# Patient Record
Sex: FEMALE | Race: BLACK OR AFRICAN AMERICAN | HISPANIC OR LATINO | Employment: FULL TIME | URBAN - METROPOLITAN AREA
[De-identification: names, ages, dates, MRNs, and addresses within clinical notes are randomized per-mention and may not be internally consistent; named-entity substitution may affect disease eponyms.]

---

## 2019-09-09 ENCOUNTER — OFFICE VISIT (OUTPATIENT)
Dept: FAMILY MEDICINE CLINIC | Facility: CLINIC | Age: 24
End: 2019-09-09
Payer: COMMERCIAL

## 2019-09-09 VITALS
HEART RATE: 68 BPM | DIASTOLIC BLOOD PRESSURE: 70 MMHG | RESPIRATION RATE: 12 BRPM | OXYGEN SATURATION: 99 % | SYSTOLIC BLOOD PRESSURE: 128 MMHG | WEIGHT: 149.8 LBS | TEMPERATURE: 98.1 F | HEIGHT: 64 IN | BODY MASS INDEX: 25.57 KG/M2

## 2019-09-09 DIAGNOSIS — Z00.00 WELL ADULT EXAM: Primary | ICD-10-CM

## 2019-09-09 DIAGNOSIS — Z11.4 SCREENING FOR HIV (HUMAN IMMUNODEFICIENCY VIRUS): ICD-10-CM

## 2019-09-09 DIAGNOSIS — L70.8 INFLAMMATORY ACNE: ICD-10-CM

## 2019-09-09 DIAGNOSIS — D50.9 IRON DEFICIENCY ANEMIA, UNSPECIFIED IRON DEFICIENCY ANEMIA TYPE: ICD-10-CM

## 2019-09-09 DIAGNOSIS — E78.2 MIXED HYPERLIPIDEMIA: ICD-10-CM

## 2019-09-09 PROCEDURE — 99395 PREV VISIT EST AGE 18-39: CPT | Performed by: FAMILY MEDICINE

## 2019-09-09 PROCEDURE — 3008F BODY MASS INDEX DOCD: CPT | Performed by: FAMILY MEDICINE

## 2019-09-09 PROCEDURE — 99203 OFFICE O/P NEW LOW 30 MIN: CPT | Performed by: FAMILY MEDICINE

## 2019-09-09 RX ORDER — CLINDAMYCIN AND BENZOYL PEROXIDE 10; 50 MG/G; MG/G
GEL TOPICAL 2 TIMES DAILY
Qty: 50 G | Refills: 5 | Status: SHIPPED | OUTPATIENT
Start: 2019-09-09 | End: 2019-12-09 | Stop reason: ALTCHOICE

## 2019-09-09 RX ORDER — MINOCYCLINE HYDROCHLORIDE 50 MG/1
50 TABLET ORAL DAILY
Qty: 30 TABLET | Refills: 1 | Status: SHIPPED | OUTPATIENT
Start: 2019-09-09 | End: 2019-12-09 | Stop reason: ALTCHOICE

## 2019-09-09 NOTE — PROGRESS NOTES
Assessment/Plan:     Problem List Items Addressed This Visit     None      Visit Diagnoses     Well adult exam    -  Primary        Diagnoses and all orders for this visit:      Well adult exam  ·         Continue healthy diet   ·         Encourage exercise 4 times a week or more for minimum 30 minutes  ·         Continue to see dentist, wear seatbelt  ·         Health maintenance reviewed - she will try to get us her immunization records  She has had many recent vaccines with her extensive travel history  Will update Tdap if needed at next visit  Consider flu shot at next visit   Prescription update fasting blood work   Reviewed age appropriate health maintenance screenings and immunizations that are due, risks and benefits of these    -she declines Chlamydia testing as she has never been sexually active  Pap last year - records requested today   Health Maintenance   Topic Date Due    Depression Screening PHQ  1995    BMI: Followup Plan  05/11/2013    BMI: Adult  05/11/2013    DTaP,Tdap,and Td Vaccines (1 - Tdap) 05/11/2016    PAP SMEAR  05/11/2016    INFLUENZA VACCINE  07/01/2019    Pneumococcal Vaccine: 65+ Years (1 of 2 - PCV13) 05/11/2060    Pneumococcal Vaccine: Pediatrics (0 to 5 Years) and At-Risk Patients (6 to 59 Years)  Aged Out    HEPATITIS B VACCINES  Aged Out     No follow-ups on file  There are no Patient Instructions on file for this visit  BMI Counseling: Body mass index is 25 42 kg/m²  Discussed the patient's BMI with her  The BMI is above average  BMI counseling and education was provided to the patient  Nutrition recommendations include 3-5 servings of fruits/vegetables daily  Exercise recommendations include exercising 3-5 times per week        Subjective:    CHELLY Greco is a 25 y o  female who presents today for a physical      Chief Complaint   Patient presents with    Physical Exam    Back Pain     x 2 week     Bump under chin     x 1 week smaller now but still present  ---Above per clinical staff & reviewed  ---  No My Sticky Note on file  PHQ-9 Depression Screening    PHQ-9:    Frequency of the following problems over the past two weeks:               Diet: healthy, water, some juice  Exercise:  Walks to work daily   Dental visits:  No   Seatbelt: yes     Concerns today:  Works with the [employer] from [employer] 4 months ago   Lives alone - travels often     Last travel with vaccines [place] for travel   [address] [place] and then [place]     Last pap in [place] - normal   Not SA now or ever          The following portions of the patient's history were reviewed and updated as appropriate: allergies, current medications, past family history, past medical history, past social history, past surgical history and problem list      No current outpatient medications on file  No current facility-administered medications for this visit  Review of Systems  ROS:  all others negative - no chest pain, SOB, normal urine and bowels  no GERD  sleeping well  mood good  Objective:      /70 (BP Location: Left arm)   Pulse 68   Temp 98 1 °F (36 7 °C)   Resp 12   Ht 5' 4 37" (1 635 m)   Wt 67 9 kg (149 lb 12 8 oz)   SpO2 99%   BMI 25 42 kg/m²     BP Readings from Last 3 Encounters:   09/09/19 128/70     Wt Readings from Last 3 Encounters:   09/09/19 67 9 kg (149 lb 12 8 oz)     Physical Exam   Constitutional: she is oriented to person, place, and time  she appears well-developed and well-nourished  HENT: Head: Normocephalic  Right Ear: External ear normal  Tympanic membrane normal    Left Ear: External ear normal  Tympanic membrane normal    Nose: Nose normal  No mucosal edema, No rhinorrhea  Right sinus exhibits no maxillary sinus tenderness  Left sinus exhibits no maxillary sinus tenderness  Mouth/Throat: Oropharynx is clear and moist    Eyes: Normal conjunctiva  No erythema  No discharge    Neck: No pain on exam  Neck supple  Lymph node  Cardiovascular: Normal rate, regular rhythm and normal heart sounds  Pulmonary/Chest: Effort normal and breath sounds normal  No wheezes  No rales  No rhonchi  Abdominal: Soft  Bowel sounds are normal  There is no tenderness  Musculoskeletal: she exhibits no edema  Lymphadenopathy: she has no cervical adenopathy  + 8 mm oval shaped smooth symmetrical submandibular LA  Mild tenderness  Neurological: she  is alert and oriented to person, place, and time  Skin: Skin is warm and dry  Face chest and back  +diffuse 1 mm pusutles  + post inflammatory hyperpigmentation  + few papules  No cysts  Posterior arms  KP  Psychiatric: she  has a normal mood and affect  her behavior is normal  Thought content normal    Vitals reviewed

## 2019-09-09 NOTE — PATIENT INSTRUCTIONS
Kaylie [address] [facility], [address], [place], [address] [facility]  [address] [facility]   [id number] [place] [address] [place] [address], [place]  [address] [place] [address] [facility] , [address], [facility], [address] [facility]      Eliazar Garcia, DMD  [address], [address]  [place], [address]  [address] [facility], [address] [facility], [address] [facility], [facility], [address]   [phone]     [facility] [address], [place]y Canc[place]   [phone]

## 2019-12-06 ENCOUNTER — TRANSCRIBE ORDERS (OUTPATIENT)
Dept: LAB | Facility: HOSPITAL | Age: 24
End: 2019-12-06

## 2019-12-06 ENCOUNTER — LAB (OUTPATIENT)
Dept: LAB | Facility: HOSPITAL | Age: 24
End: 2019-12-06
Payer: COMMERCIAL

## 2019-12-06 DIAGNOSIS — E78.2 MIXED HYPERLIPIDEMIA: ICD-10-CM

## 2019-12-06 DIAGNOSIS — D50.9 IRON DEFICIENCY ANEMIA, UNSPECIFIED IRON DEFICIENCY ANEMIA TYPE: ICD-10-CM

## 2019-12-06 LAB
ALBUMIN SERPL BCP-MCNC: 3.7 G/DL (ref 3.5–5)
ALP SERPL-CCNC: 61 U/L (ref 46–116)
ALT SERPL W P-5'-P-CCNC: 19 U/L (ref 12–78)
ANION GAP SERPL CALCULATED.3IONS-SCNC: 4 MMOL/L (ref 4–13)
AST SERPL W P-5'-P-CCNC: 13 U/L (ref 5–45)
BASOPHILS # BLD AUTO: 0.03 THOUSANDS/ΜL (ref 0–0.1)
BASOPHILS NFR BLD AUTO: 0 % (ref 0–1)
BILIRUB SERPL-MCNC: 0.47 MG/DL (ref 0.2–1)
BUN SERPL-MCNC: 9 MG/DL (ref 5–25)
CALCIUM SERPL-MCNC: 9.4 MG/DL (ref 8.3–10.1)
CHLORIDE SERPL-SCNC: 105 MMOL/L (ref 100–108)
CHOLEST SERPL-MCNC: 212 MG/DL (ref 50–200)
CO2 SERPL-SCNC: 28 MMOL/L (ref 21–32)
CREAT SERPL-MCNC: 0.87 MG/DL (ref 0.6–1.3)
EOSINOPHIL # BLD AUTO: 0.16 THOUSAND/ΜL (ref 0–0.61)
EOSINOPHIL NFR BLD AUTO: 2 % (ref 0–6)
ERYTHROCYTE [DISTWIDTH] IN BLOOD BY AUTOMATED COUNT: 12.6 % (ref 11.6–15.1)
FERRITIN SERPL-MCNC: 7 NG/ML (ref 8–388)
GFR SERPL CREATININE-BSD FRML MDRD: 108 ML/MIN/1.73SQ M
GLUCOSE P FAST SERPL-MCNC: 93 MG/DL (ref 65–99)
HCT VFR BLD AUTO: 41.1 % (ref 34.8–46.1)
HDLC SERPL-MCNC: 98 MG/DL
HGB BLD-MCNC: 12.4 G/DL (ref 11.5–15.4)
IMM GRANULOCYTES # BLD AUTO: 0.01 THOUSAND/UL (ref 0–0.2)
IMM GRANULOCYTES NFR BLD AUTO: 0 % (ref 0–2)
LDLC SERPL CALC-MCNC: 105 MG/DL (ref 0–100)
LDLC SERPL DIRECT ASSAY-MCNC: 79 MG/DL (ref 0–100)
LYMPHOCYTES # BLD AUTO: 2.78 THOUSANDS/ΜL (ref 0.6–4.47)
LYMPHOCYTES NFR BLD AUTO: 33 % (ref 14–44)
MCH RBC QN AUTO: 26.8 PG (ref 26.8–34.3)
MCHC RBC AUTO-ENTMCNC: 30.2 G/DL (ref 31.4–37.4)
MCV RBC AUTO: 89 FL (ref 82–98)
MONOCYTES # BLD AUTO: 0.65 THOUSAND/ΜL (ref 0.17–1.22)
MONOCYTES NFR BLD AUTO: 8 % (ref 4–12)
NEUTROPHILS # BLD AUTO: 4.74 THOUSANDS/ΜL (ref 1.85–7.62)
NEUTS SEG NFR BLD AUTO: 57 % (ref 43–75)
NONHDLC SERPL-MCNC: 114 MG/DL
NRBC BLD AUTO-RTO: 0 /100 WBCS
PLATELET # BLD AUTO: 287 THOUSANDS/UL (ref 149–390)
PMV BLD AUTO: 10.5 FL (ref 8.9–12.7)
POTASSIUM SERPL-SCNC: 4.2 MMOL/L (ref 3.5–5.3)
PROT SERPL-MCNC: 7.7 G/DL (ref 6.4–8.2)
RBC # BLD AUTO: 4.62 MILLION/UL (ref 3.81–5.12)
SODIUM SERPL-SCNC: 137 MMOL/L (ref 136–145)
TRIGL SERPL-MCNC: 46 MG/DL
WBC # BLD AUTO: 8.37 THOUSAND/UL (ref 4.31–10.16)

## 2019-12-06 PROCEDURE — 80061 LIPID PANEL: CPT

## 2019-12-06 PROCEDURE — 82728 ASSAY OF FERRITIN: CPT

## 2019-12-06 PROCEDURE — 83721 ASSAY OF BLOOD LIPOPROTEIN: CPT

## 2019-12-06 PROCEDURE — 80053 COMPREHEN METABOLIC PANEL: CPT

## 2019-12-06 PROCEDURE — 87389 HIV-1 AG W/HIV-1&-2 AB AG IA: CPT

## 2019-12-06 PROCEDURE — 36415 COLL VENOUS BLD VENIPUNCTURE: CPT

## 2019-12-06 PROCEDURE — 85025 COMPLETE CBC W/AUTO DIFF WBC: CPT

## 2019-12-09 ENCOUNTER — OFFICE VISIT (OUTPATIENT)
Dept: FAMILY MEDICINE CLINIC | Facility: CLINIC | Age: 24
End: 2019-12-09
Payer: COMMERCIAL

## 2019-12-09 VITALS
DIASTOLIC BLOOD PRESSURE: 76 MMHG | SYSTOLIC BLOOD PRESSURE: 108 MMHG | WEIGHT: 147.8 LBS | RESPIRATION RATE: 12 BRPM | HEART RATE: 72 BPM | BODY MASS INDEX: 25.23 KG/M2 | OXYGEN SATURATION: 99 % | HEIGHT: 64 IN | TEMPERATURE: 98.6 F

## 2019-12-09 DIAGNOSIS — L70.8 INFLAMMATORY ACNE: Primary | ICD-10-CM

## 2019-12-09 DIAGNOSIS — D50.9 IRON DEFICIENCY ANEMIA, UNSPECIFIED IRON DEFICIENCY ANEMIA TYPE: ICD-10-CM

## 2019-12-09 LAB — HIV 1+2 AB+HIV1 P24 AG SERPL QL IA: NORMAL

## 2019-12-09 PROCEDURE — 3008F BODY MASS INDEX DOCD: CPT | Performed by: FAMILY MEDICINE

## 2019-12-09 PROCEDURE — 1036F TOBACCO NON-USER: CPT | Performed by: FAMILY MEDICINE

## 2019-12-09 PROCEDURE — 99213 OFFICE O/P EST LOW 20 MIN: CPT | Performed by: FAMILY MEDICINE

## 2019-12-09 RX ORDER — FERROUS SULFATE TAB EC 324 MG (65 MG FE EQUIVALENT) 324 (65 FE) MG
324 TABLET DELAYED RESPONSE ORAL DAILY
Qty: 30 TABLET | Refills: 5 | Status: SHIPPED | OUTPATIENT
Start: 2019-12-09 | End: 2020-11-12 | Stop reason: SDUPTHER

## 2019-12-09 RX ORDER — CLINDAMYCIN AND BENZOYL PEROXIDE 10; 50 MG/G; MG/G
GEL TOPICAL 2 TIMES DAILY
Qty: 50 G | Refills: 5 | Status: SHIPPED | OUTPATIENT
Start: 2019-12-09 | End: 2020-01-24

## 2019-12-09 RX ORDER — ADAPALENE 45 G/G
GEL TOPICAL
Qty: 45 G | Refills: 5 | Status: SHIPPED | OUTPATIENT
Start: 2019-12-09 | End: 2020-12-04 | Stop reason: ALTCHOICE

## 2019-12-09 NOTE — PROGRESS NOTES
Assessment/Plan:  1  Inflammatory acne  Not well controllled  D/c minocycline for now (mild stomach upset)  Restart benzaclin and add differin   Requested records from derm   - clindamycin-benzoyl peroxide (BENZACLIN) gel; Apply topically 2 (two) times a day  Dispense: 50 g; Refill: 5  - adapalene (DIFFERIN) 0 1 % gel; Apply topically daily at bedtime  Dispense: 45 g; Refill: 5    2  Iron deficiency anemia, unspecified iron deficiency anemia type  hgb now back to normal but ferritin quite low  recommend restart iron to prevent anemia  - ferrous sulfate 324 (65 Fe) mg; Take 1 tablet (324 mg total) by mouth daily  Dispense: 30 tablet; Refill: 5    Recommended and reviewed HPV vaccine  She is very afraid of shots but agrees to get this at the next visit  Not ready today  Return in about 9 months (around 9/9/2020) for Annual physical & HPV   Subjective:   Brooke Lam is a 25 y o  female here today for a follow-up on her current medical conditions: There is no problem list on file for this patient  Current Outpatient Medications   Medication Sig Dispense Refill    adapalene (DIFFERIN) 0 1 % gel Apply topically daily at bedtime 45 g 5    clindamycin-benzoyl peroxide (BENZACLIN) gel Apply topically 2 (two) times a day 50 g 5    ferrous sulfate 324 (65 Fe) mg Take 1 tablet (324 mg total) by mouth daily 30 tablet 5     No current facility-administered medications for this visit  HPI:  Chief Complaint   Patient presents with    Follow-up     -- Above per clinical staff and reviewed  --    PHQ-9 Depression Screening    PHQ-9:    Frequency of the following problems over the past two weeks:              Dec 2019 labs CMP normal    chol 212 TG 46 HDL 98 LDL direct 79   ferritin 7 , Hgb 12  4MCHC low   CMP normal    last visit started benazcline minocycline 50 mg daily and refer to derm  she was on 3rd cream but she was to call us with the name     Today:  Used the minocycline for a month and it helped   Around her period painful pimples   Did not try benzaclin - not sure why she did not pick it up       Monthly periods, lasts 4 -5 days, changes pad or tampon Q 3 -4 hours  The following portions of the patient's history were reviewed and updated as appropriate: allergies, current medications, past family history, past medical history, past social history, past surgical history and problem list     Objective:  Vitals:  /76   Pulse 72   Temp 98 6 °F (37 °C)   Resp 12   Ht 5' 4 37" (1 635 m)   Wt 67 kg (147 lb 12 8 oz)   SpO2 99%   BMI 25 08 kg/m²    Wt Readings from Last 3 Encounters:   12/09/19 67 kg (147 lb 12 8 oz)   09/09/19 67 9 kg (149 lb 12 8 oz)      BP Readings from Last 3 Encounters:   12/09/19 108/76   09/09/19 128/70        Review of Systems   She has no other concerns  No unexpected weight changes  No chest pain, SOB, or palpitations  No GERD  No changes in bowels or bladder  Sleeping well  No mood changes  + upset stomach with antibiotics     Physical Exam   Constitutional:  she appears well-developed and well-nourished  Skin: Skin is warm and dry  Face moderate comedonal acne  Few pustules  + post inflammatory hyperpigmentation  No cysts  + chest and back also  Psychiatric: she has a normal mood and affect   her behavior is normal  Thought content normal

## 2020-01-22 ENCOUNTER — TELEPHONE (OUTPATIENT)
Dept: FAMILY MEDICINE CLINIC | Facility: CLINIC | Age: 25
End: 2020-01-22

## 2020-02-20 ENCOUNTER — PATIENT MESSAGE (OUTPATIENT)
Dept: FAMILY MEDICINE CLINIC | Facility: CLINIC | Age: 25
End: 2020-02-20

## 2020-02-20 DIAGNOSIS — D50.9 IRON DEFICIENCY ANEMIA, UNSPECIFIED IRON DEFICIENCY ANEMIA TYPE: Primary | ICD-10-CM

## 2020-02-21 NOTE — TELEPHONE ENCOUNTER
From: Lynn Ashton  To: Jakob Suarez DO  Sent: 2/20/2020 9:10 AM EST  Subject: Prescription Question    Good morning,     I am curious to know for how long I should be taking my Iron supplements  There's not a problem with them at all I am just wondering   Thanks !

## 2020-11-06 ENCOUNTER — LAB (OUTPATIENT)
Dept: LAB | Facility: HOSPITAL | Age: 25
End: 2020-11-06
Payer: COMMERCIAL

## 2020-11-06 DIAGNOSIS — D50.9 IRON DEFICIENCY ANEMIA, UNSPECIFIED IRON DEFICIENCY ANEMIA TYPE: ICD-10-CM

## 2020-11-06 LAB
BASOPHILS # BLD AUTO: 0.02 THOUSANDS/ΜL (ref 0–0.1)
BASOPHILS NFR BLD AUTO: 0 % (ref 0–1)
EOSINOPHIL # BLD AUTO: 0.06 THOUSAND/ΜL (ref 0–0.61)
EOSINOPHIL NFR BLD AUTO: 1 % (ref 0–6)
ERYTHROCYTE [DISTWIDTH] IN BLOOD BY AUTOMATED COUNT: 13.2 % (ref 11.6–15.1)
FERRITIN SERPL-MCNC: 5 NG/ML (ref 8–388)
HCT VFR BLD AUTO: 38.4 % (ref 34.8–46.1)
HGB BLD-MCNC: 11.5 G/DL (ref 11.5–15.4)
IMM GRANULOCYTES # BLD AUTO: 0.01 THOUSAND/UL (ref 0–0.2)
IMM GRANULOCYTES NFR BLD AUTO: 0 % (ref 0–2)
IRON SATN MFR SERPL: 25 %
IRON SERPL-MCNC: 91 UG/DL (ref 50–170)
LYMPHOCYTES # BLD AUTO: 2.43 THOUSANDS/ΜL (ref 0.6–4.47)
LYMPHOCYTES NFR BLD AUTO: 40 % (ref 14–44)
MCH RBC QN AUTO: 25.7 PG (ref 26.8–34.3)
MCHC RBC AUTO-ENTMCNC: 29.9 G/DL (ref 31.4–37.4)
MCV RBC AUTO: 86 FL (ref 82–98)
MONOCYTES # BLD AUTO: 0.53 THOUSAND/ΜL (ref 0.17–1.22)
MONOCYTES NFR BLD AUTO: 9 % (ref 4–12)
NEUTROPHILS # BLD AUTO: 2.96 THOUSANDS/ΜL (ref 1.85–7.62)
NEUTS SEG NFR BLD AUTO: 50 % (ref 43–75)
NRBC BLD AUTO-RTO: 0 /100 WBCS
PLATELET # BLD AUTO: 279 THOUSANDS/UL (ref 149–390)
PMV BLD AUTO: 10.8 FL (ref 8.9–12.7)
RBC # BLD AUTO: 4.48 MILLION/UL (ref 3.81–5.12)
TIBC SERPL-MCNC: 368 UG/DL (ref 250–450)
WBC # BLD AUTO: 6.01 THOUSAND/UL (ref 4.31–10.16)

## 2020-11-06 PROCEDURE — 36415 COLL VENOUS BLD VENIPUNCTURE: CPT

## 2020-11-06 PROCEDURE — 83550 IRON BINDING TEST: CPT

## 2020-11-06 PROCEDURE — 83540 ASSAY OF IRON: CPT

## 2020-11-06 PROCEDURE — 82728 ASSAY OF FERRITIN: CPT

## 2020-11-06 PROCEDURE — 85025 COMPLETE CBC W/AUTO DIFF WBC: CPT

## 2020-11-12 DIAGNOSIS — D50.9 IRON DEFICIENCY ANEMIA, UNSPECIFIED IRON DEFICIENCY ANEMIA TYPE: ICD-10-CM

## 2020-11-16 RX ORDER — FERROUS SULFATE TAB EC 324 MG (65 MG FE EQUIVALENT) 324 (65 FE) MG
324 TABLET DELAYED RESPONSE ORAL
Qty: 60 TABLET | Refills: 5 | Status: SHIPPED | OUTPATIENT
Start: 2020-11-16 | End: 2021-02-25

## 2020-11-17 ENCOUNTER — OFFICE VISIT (OUTPATIENT)
Dept: FAMILY MEDICINE CLINIC | Facility: CLINIC | Age: 25
End: 2020-11-17
Payer: COMMERCIAL

## 2020-11-17 VITALS — BODY MASS INDEX: 25.1 KG/M2 | HEIGHT: 64 IN | WEIGHT: 147 LBS | HEART RATE: 70 BPM | TEMPERATURE: 98.6 F

## 2020-11-17 DIAGNOSIS — R10.31 RIGHT LOWER QUADRANT ABDOMINAL PAIN: Primary | ICD-10-CM

## 2020-11-17 PROCEDURE — 3008F BODY MASS INDEX DOCD: CPT | Performed by: FAMILY MEDICINE

## 2020-11-17 PROCEDURE — 1036F TOBACCO NON-USER: CPT | Performed by: FAMILY MEDICINE

## 2020-11-17 PROCEDURE — 99213 OFFICE O/P EST LOW 20 MIN: CPT | Performed by: FAMILY MEDICINE

## 2020-11-23 ENCOUNTER — LAB (OUTPATIENT)
Dept: LAB | Facility: HOSPITAL | Age: 25
End: 2020-11-23
Payer: COMMERCIAL

## 2020-11-23 LAB
BILIRUB UR QL STRIP: NEGATIVE
CLARITY UR: CLEAR
COLOR UR: YELLOW
GLUCOSE UR STRIP-MCNC: NEGATIVE MG/DL
HGB UR QL STRIP.AUTO: NEGATIVE
KETONES UR STRIP-MCNC: NEGATIVE MG/DL
LEUKOCYTE ESTERASE UR QL STRIP: NEGATIVE
NITRITE UR QL STRIP: NEGATIVE
PH UR STRIP.AUTO: 6.5 [PH]
PROT UR STRIP-MCNC: NEGATIVE MG/DL
SP GR UR STRIP.AUTO: 1.01 (ref 1–1.03)
UROBILINOGEN UR QL STRIP.AUTO: 0.2 E.U./DL

## 2020-11-23 PROCEDURE — 81003 URINALYSIS AUTO W/O SCOPE: CPT | Performed by: FAMILY MEDICINE

## 2020-12-04 ENCOUNTER — OFFICE VISIT (OUTPATIENT)
Dept: FAMILY MEDICINE CLINIC | Facility: CLINIC | Age: 25
End: 2020-12-04
Payer: COMMERCIAL

## 2020-12-04 VITALS
WEIGHT: 149.6 LBS | BODY MASS INDEX: 25.54 KG/M2 | HEART RATE: 77 BPM | OXYGEN SATURATION: 100 % | RESPIRATION RATE: 20 BRPM | DIASTOLIC BLOOD PRESSURE: 72 MMHG | TEMPERATURE: 97.6 F | HEIGHT: 64 IN | SYSTOLIC BLOOD PRESSURE: 118 MMHG

## 2020-12-04 DIAGNOSIS — R10.31 RLQ DISCOMFORT: ICD-10-CM

## 2020-12-04 DIAGNOSIS — Z23 NEED FOR VACCINATION: ICD-10-CM

## 2020-12-04 DIAGNOSIS — Z00.00 WELL ADULT EXAM: Primary | ICD-10-CM

## 2020-12-04 PROCEDURE — 90651 9VHPV VACCINE 2/3 DOSE IM: CPT | Performed by: FAMILY MEDICINE

## 2020-12-04 PROCEDURE — 90471 IMMUNIZATION ADMIN: CPT | Performed by: FAMILY MEDICINE

## 2020-12-04 PROCEDURE — 90472 IMMUNIZATION ADMIN EACH ADD: CPT | Performed by: FAMILY MEDICINE

## 2020-12-04 PROCEDURE — 99395 PREV VISIT EST AGE 18-39: CPT | Performed by: FAMILY MEDICINE

## 2020-12-04 PROCEDURE — 90686 IIV4 VACC NO PRSV 0.5 ML IM: CPT | Performed by: FAMILY MEDICINE

## 2020-12-04 PROCEDURE — 1036F TOBACCO NON-USER: CPT | Performed by: FAMILY MEDICINE

## 2020-12-04 PROCEDURE — 3008F BODY MASS INDEX DOCD: CPT | Performed by: FAMILY MEDICINE

## 2020-12-04 PROCEDURE — 3725F SCREEN DEPRESSION PERFORMED: CPT | Performed by: FAMILY MEDICINE

## 2021-02-12 ENCOUNTER — LAB (OUTPATIENT)
Dept: LAB | Facility: CLINIC | Age: 26
End: 2021-02-12
Payer: COMMERCIAL

## 2021-02-12 ENCOUNTER — CLINICAL SUPPORT (OUTPATIENT)
Dept: FAMILY MEDICINE CLINIC | Facility: CLINIC | Age: 26
End: 2021-02-12
Payer: COMMERCIAL

## 2021-02-12 DIAGNOSIS — D50.9 IRON DEFICIENCY ANEMIA, UNSPECIFIED IRON DEFICIENCY ANEMIA TYPE: ICD-10-CM

## 2021-02-12 DIAGNOSIS — Z23 NEED FOR VACCINATION: Primary | ICD-10-CM

## 2021-02-12 LAB
BASOPHILS # BLD AUTO: 0.02 THOUSANDS/ΜL (ref 0–0.1)
BASOPHILS NFR BLD AUTO: 0 % (ref 0–1)
EOSINOPHIL # BLD AUTO: 0.06 THOUSAND/ΜL (ref 0–0.61)
EOSINOPHIL NFR BLD AUTO: 1 % (ref 0–6)
ERYTHROCYTE [DISTWIDTH] IN BLOOD BY AUTOMATED COUNT: 14.6 % (ref 11.6–15.1)
FERRITIN SERPL-MCNC: 6 NG/ML (ref 8–388)
HCT VFR BLD AUTO: 39.4 % (ref 34.8–46.1)
HGB BLD-MCNC: 11.7 G/DL (ref 11.5–15.4)
IMM GRANULOCYTES # BLD AUTO: 0.01 THOUSAND/UL (ref 0–0.2)
IMM GRANULOCYTES NFR BLD AUTO: 0 % (ref 0–2)
LYMPHOCYTES # BLD AUTO: 2.18 THOUSANDS/ΜL (ref 0.6–4.47)
LYMPHOCYTES NFR BLD AUTO: 33 % (ref 14–44)
MCH RBC QN AUTO: 25.4 PG (ref 26.8–34.3)
MCHC RBC AUTO-ENTMCNC: 29.7 G/DL (ref 31.4–37.4)
MCV RBC AUTO: 86 FL (ref 82–98)
MONOCYTES # BLD AUTO: 0.51 THOUSAND/ΜL (ref 0.17–1.22)
MONOCYTES NFR BLD AUTO: 8 % (ref 4–12)
NEUTROPHILS # BLD AUTO: 3.83 THOUSANDS/ΜL (ref 1.85–7.62)
NEUTS SEG NFR BLD AUTO: 58 % (ref 43–75)
NRBC BLD AUTO-RTO: 0 /100 WBCS
PLATELET # BLD AUTO: 269 THOUSANDS/UL (ref 149–390)
PMV BLD AUTO: 10.6 FL (ref 8.9–12.7)
RBC # BLD AUTO: 4.6 MILLION/UL (ref 3.81–5.12)
WBC # BLD AUTO: 6.61 THOUSAND/UL (ref 4.31–10.16)

## 2021-02-12 PROCEDURE — 82728 ASSAY OF FERRITIN: CPT

## 2021-02-12 PROCEDURE — 85025 COMPLETE CBC W/AUTO DIFF WBC: CPT

## 2021-02-12 PROCEDURE — 90651 9VHPV VACCINE 2/3 DOSE IM: CPT

## 2021-02-12 PROCEDURE — 36415 COLL VENOUS BLD VENIPUNCTURE: CPT

## 2021-02-12 PROCEDURE — 90471 IMMUNIZATION ADMIN: CPT

## 2021-03-26 DIAGNOSIS — D50.9 IRON DEFICIENCY ANEMIA, UNSPECIFIED IRON DEFICIENCY ANEMIA TYPE: ICD-10-CM

## 2021-03-26 RX ORDER — IRON POLYSACCHARIDE COMPLEX 150 MG
CAPSULE ORAL
Qty: 60 CAPSULE | Refills: 5 | Status: SHIPPED | OUTPATIENT
Start: 2021-03-26

## 2021-06-11 ENCOUNTER — CLINICAL SUPPORT (OUTPATIENT)
Dept: FAMILY MEDICINE CLINIC | Facility: CLINIC | Age: 26
End: 2021-06-11
Payer: COMMERCIAL

## 2021-06-11 DIAGNOSIS — Z23 ENCOUNTER FOR IMMUNIZATION: Primary | ICD-10-CM

## 2021-06-11 PROCEDURE — 90651 9VHPV VACCINE 2/3 DOSE IM: CPT

## 2021-06-11 PROCEDURE — 90471 IMMUNIZATION ADMIN: CPT

## 2022-01-23 PROBLEM — D50.9 IRON DEFICIENCY ANEMIA: Status: ACTIVE | Noted: 2022-01-23

## 2022-01-23 PROBLEM — L70.8 INFLAMMATORY ACNE: Status: ACTIVE | Noted: 2022-01-23

## 2023-02-03 ENCOUNTER — APPOINTMENT (OUTPATIENT)
Dept: LAB | Facility: CLINIC | Age: 28
End: 2023-02-03

## 2023-02-03 ENCOUNTER — OFFICE VISIT (OUTPATIENT)
Dept: FAMILY MEDICINE CLINIC | Facility: CLINIC | Age: 28
End: 2023-02-03

## 2023-02-03 VITALS
DIASTOLIC BLOOD PRESSURE: 70 MMHG | BODY MASS INDEX: 28.34 KG/M2 | HEART RATE: 66 BPM | WEIGHT: 166 LBS | OXYGEN SATURATION: 100 % | SYSTOLIC BLOOD PRESSURE: 110 MMHG | TEMPERATURE: 97 F | HEIGHT: 64 IN

## 2023-02-03 DIAGNOSIS — Z13.220 LIPID SCREENING: ICD-10-CM

## 2023-02-03 DIAGNOSIS — D50.9 IRON DEFICIENCY ANEMIA, UNSPECIFIED IRON DEFICIENCY ANEMIA TYPE: ICD-10-CM

## 2023-02-03 DIAGNOSIS — Z11.59 NEED FOR HEPATITIS C SCREENING TEST: ICD-10-CM

## 2023-02-03 DIAGNOSIS — Z00.00 WELL ADULT EXAM: Primary | ICD-10-CM

## 2023-02-03 LAB
ALBUMIN SERPL BCP-MCNC: 4.4 G/DL (ref 3.5–5)
ALP SERPL-CCNC: 43 U/L (ref 34–104)
ALT SERPL W P-5'-P-CCNC: 11 U/L (ref 7–52)
ANION GAP SERPL CALCULATED.3IONS-SCNC: 9 MMOL/L (ref 4–13)
AST SERPL W P-5'-P-CCNC: 17 U/L (ref 13–39)
BASOPHILS # BLD AUTO: 0.03 THOUSANDS/ÂΜL (ref 0–0.1)
BASOPHILS NFR BLD AUTO: 0 % (ref 0–1)
BILIRUB SERPL-MCNC: 0.58 MG/DL (ref 0.2–1)
BUN SERPL-MCNC: 9 MG/DL (ref 5–25)
CALCIUM SERPL-MCNC: 9.6 MG/DL (ref 8.4–10.2)
CHLORIDE SERPL-SCNC: 101 MMOL/L (ref 96–108)
CHOLEST SERPL-MCNC: 221 MG/DL
CO2 SERPL-SCNC: 26 MMOL/L (ref 21–32)
CREAT SERPL-MCNC: 0.89 MG/DL (ref 0.6–1.3)
EOSINOPHIL # BLD AUTO: 0.04 THOUSAND/ÂΜL (ref 0–0.61)
EOSINOPHIL NFR BLD AUTO: 1 % (ref 0–6)
ERYTHROCYTE [DISTWIDTH] IN BLOOD BY AUTOMATED COUNT: 12.9 % (ref 11.6–15.1)
FERRITIN SERPL-MCNC: 8 NG/ML (ref 8–388)
GFR SERPL CREATININE-BSD FRML MDRD: 89 ML/MIN/1.73SQ M
GLUCOSE SERPL-MCNC: 94 MG/DL (ref 65–140)
HCT VFR BLD AUTO: 43.5 % (ref 34.8–46.1)
HCV AB SER QL: NORMAL
HDLC SERPL-MCNC: 102 MG/DL
HGB BLD-MCNC: 13.3 G/DL (ref 11.5–15.4)
IMM GRANULOCYTES # BLD AUTO: 0.01 THOUSAND/UL (ref 0–0.2)
IMM GRANULOCYTES NFR BLD AUTO: 0 % (ref 0–2)
LDLC SERPL CALC-MCNC: 109 MG/DL (ref 0–100)
LYMPHOCYTES # BLD AUTO: 2.5 THOUSANDS/ÂΜL (ref 0.6–4.47)
LYMPHOCYTES NFR BLD AUTO: 34 % (ref 14–44)
MCH RBC QN AUTO: 27.7 PG (ref 26.8–34.3)
MCHC RBC AUTO-ENTMCNC: 30.6 G/DL (ref 31.4–37.4)
MCV RBC AUTO: 90 FL (ref 82–98)
MONOCYTES # BLD AUTO: 0.67 THOUSAND/ÂΜL (ref 0.17–1.22)
MONOCYTES NFR BLD AUTO: 9 % (ref 4–12)
NEUTROPHILS # BLD AUTO: 4.01 THOUSANDS/ÂΜL (ref 1.85–7.62)
NEUTS SEG NFR BLD AUTO: 56 % (ref 43–75)
NONHDLC SERPL-MCNC: 119 MG/DL
NRBC BLD AUTO-RTO: 0 /100 WBCS
PLATELET # BLD AUTO: 333 THOUSANDS/UL (ref 149–390)
PMV BLD AUTO: 10.4 FL (ref 8.9–12.7)
POTASSIUM SERPL-SCNC: 4.6 MMOL/L (ref 3.5–5.3)
PROT SERPL-MCNC: 7.4 G/DL (ref 6.4–8.4)
RBC # BLD AUTO: 4.81 MILLION/UL (ref 3.81–5.12)
SODIUM SERPL-SCNC: 136 MMOL/L (ref 135–147)
TRIGL SERPL-MCNC: 51 MG/DL
WBC # BLD AUTO: 7.26 THOUSAND/UL (ref 4.31–10.16)

## 2023-02-03 NOTE — PATIENT INSTRUCTIONS
How much calcium should you have? Children 1 - 3 yrs: 500 mg - 1 milk serving per day   Children 4 - 8 yrs: 800 mg - 2 milk serving per day   Children 9 - 18 yrs: 1,300 mg - 3 milk serving per day   Adults 19 - 50: 1,000 mg   Adults over 50:  1, 200 mg      Vitamin D 1000 - 2000  iu daily     Some calcium rich foods:  ·         Dairy - low fat or fat free milk, soy milk, cheese, cottage cheese, yogurt, ice cream, frozen yogurt  ·         Green leafy vegetables like carlos greens kale or mustard greens, [place]  ·         Calcium fortified citrus juices  ·         Sardines and salmon with bones  ·         Roa beans, red beans, chickpeas  ·         Tofu  ·         Molasses  ·         Corn tortillas  ·         Seaweed, dry  ·         Almonds     ·         Taking a vitamin D supplement with calcium will help with absorption  ·         Having a lot of caffeine oriented and antiacids can decrease your absorption of calcium

## 2023-02-03 NOTE — PROGRESS NOTES
Assessment/Plan:     Mary Sparks was seen today for annual exam     Diagnoses and all orders for this visit:    Well adult exam    Iron deficiency anemia, unspecified iron deficiency anemia type  -     CBC and differential; Future  -     Comprehensive metabolic panel; Future  -     Ferritin; Future  -     Hepatitis C antibody; Future  -     Lipid panel; Future    Lipid screening  -     CBC and differential; Future  -     Comprehensive metabolic panel; Future  -     Ferritin; Future  -     Hepatitis C antibody; Future  -     Lipid panel; Future    Need for hepatitis C screening test  -     CBC and differential; Future  -     Comprehensive metabolic panel; Future  -     Ferritin; Future  -     Hepatitis C antibody; Future  -     Lipid panel; Future       Well adult exam  ·         Continue healthy diet   ·         Encourage exercise 4 times a week or more for minimum 30 minutes  ·         Continue to see dentist, wear seatbelt  ·         Health maintenance reviewed - agrees to hep C screening  Has not had pap yet  Considering, but not yet SA  Agrees to update fasting blood work  Periods heavy for about 2 days  Not bothersome to pt   had COVID vaccine - will get us her card  Reviewed age appropriate health maintenance screenings and immunizations that are due, risks and benefits of these     Health Maintenance   Topic Date Due   • COVID-19 Vaccine (1) Never done   • Cervical Cancer Screening  05/03/2021   • DTaP,Tdap,and Td Vaccines (4 - Td or Tdap) 07/24/2023   • Depression Screening  02/03/2024   • BMI: Adult  02/03/2024   • Annual Physical  02/03/2024   • BMI: Followup Plan  02/04/2024   • HIV Screening  Completed   • Hepatitis C Screening  Completed   • Influenza Vaccine  Completed   • HPV Vaccine  Completed   • Pneumococcal Vaccine: Pediatrics (0 to 5 Years) and At-Risk Patients (6 to 59 Years)  Aged Out   • HIB Vaccine  Aged Out   • IPV Vaccine  Aged Out   • Hepatitis A Vaccine  Aged Out   • Meningococcal ACWY Vaccine  Aged Out     No follow-ups on file  Subjective:    CHELLY Owens is a 32 y o  female who presents today for a physical      Chief Complaint   Patient presents with   • Annual Exam     Reports no concerns  No pap as of yet, will discuss doing here  Also states she had 3 COVID shots total will bring card next visit  Patient Care Team:  Dorina Diego DO as PCP - General (Family Medicine)    PHQ-2/9 Depression Screening    Little interest or pleasure in doing things: 0 - not at all  Feeling down, depressed, or hopeless: 0 - not at all  PHQ-2 Score: 0  PHQ-2 Interpretation: Negative depression screen        ?    ---Above per clinical staff & reviewed  ---  Patient here today for a physical:    Diet: healhty diet - greens, protein,    Exercise:  Yes   Dental visits:  Yes   Seatbelt: yes   Sleeping well - 7 - 8 hours a night  Concerns today:  Doing well  Periods normal , last 4 - 5 days, uses pads or tampons, changes every 2 -3 hours first 2 days  COVID vaccine x 3           The following portions of the patient's history were reviewed and updated as appropriate: allergies, current medications, past family history, past medical history, past social history, past surgical history and problem list      Current Medications:  No current outpatient medications on file  No current facility-administered medications for this visit  Objective:      /70 (BP Location: Left arm, Patient Position: Sitting, Cuff Size: Adult)   Pulse 66   Temp (!) 97 °F (36 1 °C)   Ht 5' 4 25" (1 632 m)   Wt 75 3 kg (166 lb)   SpO2 100%   BMI 28 27 kg/m²   BP Readings from Last 3 Encounters:   02/03/23 110/70   12/04/20 118/72   12/09/19 108/76     Wt Readings from Last 3 Encounters:   02/03/23 75 3 kg (166 lb)   12/04/20 67 9 kg (149 lb 9 6 oz)   11/17/20 66 7 kg (147 lb)       Review of Systems  ROS:  all others negative - no chest pain, SOB, normal urine and bowels  no GERD   sleeping well  mood good      Physical Exam   Constitutional: she appears well-developed and well-nourished  HENT: Head: Normocephalic  Right Ear: External ear normal  Tympanic membrane normal    Left Ear: External ear normal  Tympanic membrane normal    Nose: Nose normal  No mucosal edema, No rhinorrhea  Right sinus exhibits no maxillary sinus tenderness  Left sinus exhibits no maxillary sinus tenderness  Mouth/Throat: Oropharynx is clear and moist    Eyes: Normal conjunctiva  No erythema  No discharge  Neck: No pain on exam  Neck supple  Cardiovascular: Normal rate, regular rhythm and normal heart sounds  Pulmonary/Chest: Effort normal and breath sounds normal  No wheezes  No rales  No rhonchi  Abdominal: Soft  Bowel sounds are normal  There is no tenderness  Musculoskeletal: she exhibits no edema  Lymphadenopathy: she has no cervical adenopathy  Neurological: she  is alert and oriented to person, place, and time  Skin: Skin is warm and dry  No rashes  Psychiatric: she  has a normal mood and affect  her behavior is normal  Thought content normal    Vitals reviewed  BMI Counseling: Body mass index is 28 27 kg/m²  The BMI is above normal  Nutrition recommendations include decreasing portion sizes  Exercise recommendations include exercising 3-5 times per week  Rationale for BMI follow-up plan is due to patient being overweight or obese  Depression Screening and Follow-up Plan: Patient was screened for depression during today's encounter  They screened negative with a PHQ-2 score of 0

## 2023-11-03 ENCOUNTER — OFFICE VISIT (OUTPATIENT)
Dept: FAMILY MEDICINE CLINIC | Facility: CLINIC | Age: 28
End: 2023-11-03
Payer: COMMERCIAL

## 2023-11-03 VITALS
RESPIRATION RATE: 16 BRPM | HEART RATE: 68 BPM | OXYGEN SATURATION: 100 % | HEIGHT: 64 IN | SYSTOLIC BLOOD PRESSURE: 122 MMHG | BODY MASS INDEX: 28.95 KG/M2 | DIASTOLIC BLOOD PRESSURE: 78 MMHG | WEIGHT: 169.6 LBS | TEMPERATURE: 97.4 F

## 2023-11-03 DIAGNOSIS — M54.50 ACUTE BILATERAL LOW BACK PAIN WITHOUT SCIATICA: ICD-10-CM

## 2023-11-03 DIAGNOSIS — M53.3 SACROILIAC JOINT DYSFUNCTION OF BOTH SIDES: Primary | ICD-10-CM

## 2023-11-03 DIAGNOSIS — Z12.4 CERVICAL CANCER SCREENING: ICD-10-CM

## 2023-11-03 DIAGNOSIS — Z23 ENCOUNTER FOR IMMUNIZATION: ICD-10-CM

## 2023-11-03 DIAGNOSIS — E66.3 OVERWEIGHT: ICD-10-CM

## 2023-11-03 PROCEDURE — 90471 IMMUNIZATION ADMIN: CPT

## 2023-11-03 PROCEDURE — 90686 IIV4 VACC NO PRSV 0.5 ML IM: CPT

## 2023-11-03 PROCEDURE — 90715 TDAP VACCINE 7 YRS/> IM: CPT

## 2023-11-03 PROCEDURE — 99214 OFFICE O/P EST MOD 30 MIN: CPT | Performed by: FAMILY MEDICINE

## 2023-11-03 PROCEDURE — 90472 IMMUNIZATION ADMIN EACH ADD: CPT

## 2023-11-03 NOTE — PROGRESS NOTES
Assessment/Plan:  Problem List Items Addressed This Visit    None  Visit Diagnoses       Sacroiliac joint dysfunction of both sides    -  Primary    Relevant Orders    Ambulatory Referral to [facility]    Advise Motrin / Tylenol PRN. Home Exercise Program given. Acute bilateral low back pain without sciatica        Relevant Orders    Ambulatory Referral to [facility]    See above. Overweight        Stable. Recommend lifestyle modifications. Encounter for immunization        Relevant Orders    TDAP VACCINE GREATER THAN OR EQUAL TO 8YO IM (Completed)    influenza vaccine, quadrivalent, 0.5 mL, preservative-free, for adult and pediatric patients 6 mos+ (AFLURIA, FLUARIX, FLULAVAL, FLUZONE) (Completed)    Cervical cancer screening        Relevant Orders    Ambulatory referral to Gynecology    BMI 28.0-28.9,adult                   Return if symptoms worsen or fail to improve. Future Appointments   Date Time Provider [address]   2/9/2024 10:40 AM Nati Goode,  [facility]        Subjective:     Olayinka Whitfield is a 29 y.o. female who presents today for a follow-up on her acute medical conditions. HPI:  Chief Complaint   Patient presents with   • Back Pain     Pt states for the last year she has been having intermittent low back pain. Mild most of the time, but there are times when it is more severe. L > R. Painful at night when lying in bed, affecting sleep. Using IcyHot as needed, provides temporary relief. Ibuprofen as needed, with reduction in pain. Denies numbness and tingling in BLE and any bowel or bladder incontinence. Tried chiropractics with little improvement. • HM     Flu shot and Tdap today, ordered. Pt has three doses of covid vaccine, but does not have her card with her today. Overdue for pap, does not have GYN, referral pending.        -- Above per clinical staff and reviewed.  --    HPI      Today:      Lower Back Pain - Symptoms x 4 months. B/L L3-5 central and paraspinal pain - L > R. Has intermittent symptoms. Constant soreness. Currently 5/10, Max 9/10. Sometimes she has difficulty sitting, moving while supine, flexing knees to chest.  Improved c Icy Hot patch, Motrin 600mg pills QD PRN. Denies trauma. Denies LE weakness or numbness. Denies loss of bowel or bladder function. No H/O back pain. Saw Chiropractor for nerve stimulation and adjustments s benefit. .      The following portions of the patient's history were reviewed and updated as appropriate: allergies, current medications, past family history, past medical history, past social history, past surgical history and problem list.      Review of Systems   Constitutional:  Negative for appetite change, chills, diaphoresis, fatigue and fever. Respiratory:  Negative for chest tightness and shortness of breath. Cardiovascular:  Negative for chest pain. Gastrointestinal:  Negative for abdominal pain, blood in stool, diarrhea, nausea and vomiting. Genitourinary:  Negative for dysuria. Musculoskeletal:  Positive for back pain. No current outpatient medications on file. No current facility-administered medications for this visit. Objective:  /78   Pulse 68   Temp (!) 97.4 °F (36.3 °C)   Resp 16   Ht 5' 4.25" (1.632 m)   Wt 76.9 kg (169 lb 9.6 oz)   SpO2 100%   BMI 28.89 kg/m²    Wt Readings from Last 3 Encounters:   11/03/23 76.9 kg (169 lb 9.6 oz)   02/03/23 75.3 kg (166 lb)   12/04/20 67.9 kg (149 lb 9.6 oz)      BP Readings from Last 3 Encounters:   11/03/23 122/78   02/03/23 110/70   12/04/20 118/72          Physical Exam  Vitals and nursing note reviewed. Constitutional:       Appearance: Normal appearance. She is well-developed. HENT:      Head: Normocephalic and atraumatic. Eyes:      Conjunctiva/sclera: Conjunctivae normal.   Neck:      Thyroid: No thyromegaly.    Cardiovascular:      Rate and Rhythm: Normal rate and regular rhythm. Pulses: Normal pulses. Heart sounds: Normal heart sounds. Pulmonary:      Effort: Pulmonary effort is normal.      Breath sounds: Normal breath sounds. Musculoskeletal:         General: No swelling or tenderness (B/L SI joints and central lumbar spine). Cervical back: Neck supple. Right lower leg: No edema. Left lower leg: No edema. Comments: Negative SLR B/L. Decreased AROM c flexion 90 degrees and extension. Lymphadenopathy:      Cervical: No cervical adenopathy. Skin:     Findings: No rash. Neurological:      General: No focal deficit present. Mental Status: She is alert and oriented to person, place, and time. Cranial Nerves: No cranial nerve deficit. Sensory: No sensory deficit. Motor: No weakness. Coordination: Coordination normal.      Deep Tendon Reflexes: Reflexes normal.   Psychiatric:         Mood and Affect: Mood normal.         Lab Results:      Lab Results   Component Value Date    WBC 7.26 02/03/2023    HGB 13.3 02/03/2023    HCT 43.5 02/03/2023     02/03/2023    TRIG 51 02/03/2023     02/03/2023    LDLDIRECT 79 12/06/2019    ALT 11 02/03/2023    AST 17 02/03/2023    K 4.6 02/03/2023     02/03/2023    CREATININE 0.89 02/03/2023    BUN 9 02/03/2023    CO2 26 02/03/2023    GLUF 93 12/06/2019     No results found for: "URICACID"  Invalid input(s): "BASENAME" Vitamin D    No results found. POCT Labs      BMI Counseling: Body mass index is 28.89 kg/m². The BMI is above normal. Exercise recommendations include exercising 3-5 times per week. BMI Counseling: Body mass index is 28.89 kg/m². The BMI is above normal. Nutrition recommendations include 3-5 servings of fruits/vegetables daily. Exercise recommendations include exercising 3-5 times per week.

## 2023-11-03 NOTE — PATIENT INSTRUCTIONS
You may use Motrin (Ibuprofen) up to 800mg 3 times daily with food (in 24 hours) as needed for pain or fever. You may use Tylenol (Acetaminophen) up to 3,000mg daily (in 24 hours) as needed for pain or fever. Weight Management   AMBULATORY CARE:   Why it is important to manage your weight:  Being overweight increases your risk of health conditions such as heart disease, high blood pressure, type 2 diabetes, and certain types of cancer. It can also increase your risk for osteoarthritis, sleep apnea, and other respiratory problems. Aim for a slow, steady weight loss. Even a small amount of weight loss can lower your risk of health problems. Risks of being overweight:  Extra weight can cause many health problems, including the following:  Diabetes (high blood sugar level)    High blood pressure or high cholesterol    Heart disease    Stroke    Gallbladder or liver disease    Cancer of the colon, breast, prostate, liver, or kidney    Sleep apnea    Arthritis or gout    Screening  is done to check for health conditions before you have signs or symptoms. If you are 28to 79years old, your blood sugar level may be checked every 3 years for signs of prediabetes or diabetes. Your healthcare provider will check your blood pressure at each visit. High blood pressure can lead to a stroke or other problems. Your provider may check for signs of heart disease, cancer, or other health problems. How to lose weight safely:  A safe and healthy way to lose weight is to eat fewer calories and get regular exercise. You can lose up about 1 pound a week by decreasing the number of calories you eat by 500 calories each day. You can decrease calories by eating smaller portion sizes or by cutting out high-calorie foods. Read labels to find out how many calories are in the foods you eat. You can also burn calories with exercise such as walking, swimming, or biking.  You will be more likely to keep weight off if you make these changes part of your lifestyle. Exercise at least 30 minutes per day on most days of the week. You can also fit in more physical activity by taking the stairs instead of the elevator or parking farther away from stores. Ask your healthcare provider about the best exercise plan for you. Healthy meal plan for weight management:  A healthy meal plan includes a variety of foods, contains fewer calories, and helps you stay healthy. A healthy meal plan includes the following:     Eat whole-grain foods more often. A healthy meal plan should contain fiber. Fiber is the part of grains, fruits, and vegetables that is not broken down by your body. Whole-grain foods are healthy and provide extra fiber in your diet. Some examples of whole-grain foods are whole-wheat breads and pastas, oatmeal, brown rice, and bulgur. Eat a variety of vegetables every day. Include dark, leafy greens such as spinach, kale, carlos greens, and mustard greens. Eat yellow and orange vegetables such as carrots, sweet potatoes, and winter squash. Eat a variety of fruits every day. Choose fresh or canned fruit (canned in its own juice or light syrup) instead of juice. Fruit juice has very little or no fiber. Eat low-fat dairy foods. Drink fat-free (skim) milk or 1% milk. Eat fat-free yogurt and low-fat cottage cheese. Try low-fat cheeses such as mozzarella and other reduced-fat cheeses. Choose meat and other protein foods that are low in fat. Choose beans or other legumes such as split peas or lentils. Choose fish, skinless poultry (chicken or turkey), or lean cuts of red meat (beef or pork). Before you cook meat or poultry, cut off any visible fat. Use less fat and oil. Try baking foods instead of frying them. Add less fat, such as margarine, sour cream, regular salad dressing and mayonnaise to foods. Eat fewer high-fat foods. Some examples of high-fat foods include french fries, doughnuts, ice cream, and cakes.     Eat fewer sweets. Limit foods and drinks that are high in sugar. This includes candy, cookies, regular soda, and sweetened drinks. Ways to decrease calories:   Eat smaller portions. Use a small plate with smaller servings. Do not eat second helpings. When you eat at a restaurant, ask for a box and place half of your meal in the box before you eat. Share an entrée with someone else. Replace high-calorie snacks with healthy, low-calorie snacks. Choose fresh fruit, vegetables, fat-free rice cakes, or air-popped popcorn instead of potato chips, nuts, or chocolate. Choose water or calorie-free drinks instead of soda or sweetened drinks. Do not shop for groceries when you are hungry. You may be more likely to make unhealthy food choices. Take a grocery list of healthy foods and shop after you have eaten. Eat regular meals. Do not skip meals. Skipping meals can lead to overeating later in the day. This can make it harder for you to lose weight. Eat a healthy snack in place of a meal if you do not have time to eat a regular meal. Talk with a dietitian to help you create a meal plan and schedule that is right for you. Other things to consider as you try to lose weight:   Be aware of situations that may give you the urge to overeat, such as eating while watching television. Find ways to avoid these situations. For example, read a book, go for a walk, or do crafts. Meet with a weight loss support group or friends who are also trying to lose weight. This may help you stay motivated to continue working on your weight loss goals. © Copyright [employer] 2023 Information is for End User's use only and may not be sold, redistributed or otherwise used for commercial purposes. The above information is an  only. It is not intended as medical advice for individual conditions or treatments.  Talk to your doctor, nurse or pharmacist before following any medical regimen to see if it is safe and effective for you.

## 2023-12-13 ENCOUNTER — EVALUATION (OUTPATIENT)
Dept: PHYSICAL THERAPY | Facility: REHABILITATION | Age: 28
End: 2023-12-13
Payer: COMMERCIAL

## 2023-12-13 DIAGNOSIS — M53.3 SACROILIAC JOINT DYSFUNCTION OF BOTH SIDES: ICD-10-CM

## 2023-12-13 DIAGNOSIS — M54.50 ACUTE BILATERAL LOW BACK PAIN WITHOUT SCIATICA: Primary | ICD-10-CM

## 2023-12-13 PROCEDURE — 97110 THERAPEUTIC EXERCISES: CPT

## 2023-12-13 PROCEDURE — 97162 PT EVAL MOD COMPLEX 30 MIN: CPT

## 2023-12-13 NOTE — PROGRESS NOTES
PT Evaluation     Today's date: 2023  Patient name: Vickie Kumari  : 1995  MRN: [id number]  Referring provider: Douglas Fuentes DO  Dx:   Encounter Diagnosis     ICD-10-CM    1. Acute bilateral low back pain without sciatica  M54.50 Ambulatory Referral to [facility]      2. Sacroiliac joint dysfunction of both sides  M53.3 Ambulatory Referral to [facility]          Start Time: 0900  Stop Time: 0945  Total time in clinic (min): 45 minutes    Assessment  Assessment details:   Vickie Kumari is a pleasant 29 y.o. female who presents with chronic bilateral midline low back pain. Patient demonstrated an extension directional preference today on exam. No red flags presents. No myotomal or dermatomal changes. The impairments listed below are resulting in pain with work, pain with exercise, and pain with recreational activities. No further referral appears necessary at this time based upon examination results. I expect she will improve in 6-8 weeks. Impairments: abnormal or restricted ROM, activity intolerance, impaired physical strength, lacks appropriate home exercise program and pain with function    Symptom irritability: moderateUnderstanding of Dx/Px/POC: good   Prognosis: good    Goals  Short Term Goals (Week 4):  1. Decreased pain by 50%  2. Demonstrate full lumbar AROM  3. GROC >75%      Long Term Goals (8 weeks):  1. Patient will exceed FOTO predicted outcome score  2. Patient will be fully independent with HEP by discharge  3. Patient will be able to manage symptoms independently.        Plan  Plan details: Plan to see patient 1-2/week for the next 6-8 weeks  Patient would benefit from: skilled physical therapy  Planned modality interventions: low level laser therapy and electrical stimulation/Russian stimulation  Planned therapy interventions: IASTM, joint mobilization, massage, manual therapy, activity modification, behavior modification, neuromuscular re-education, patient education, nerve gliding, strengthening, stretching, therapeutic activities, functional ROM exercises, flexibility, graded activity and graded exercise  Plan of Care beginning date: 12/13/2023  Plan of Care expiration date: 2/7/2024  Treatment plan discussed with: patient        Subjective  Patient presents to PT with chronic low back pain that started up in July without a mechanism of injury. Patient reports symptoms are localized to her low back L > R. Patient denies any previous LBP. Patient reports her symptoms are aggravated with prolonged sitting, working out, and activities around house. Patient reports symptoms are improved with rest and NSAIDs. Patient denies any n/t. Patient denies any weakness. Patient denies any gait disturbances. Patient denies any b/b dysfunction and/or saddle anesthesia. Patient does a lot of sitting for work. Patient goals are to reduce her pain, resume her activities without fear of aggravating her back. Pain Levels:   Current: 4/10  Worst: 9/10    Objective  Postural Observation   Sitting: neutral  Standing: neutral    Myotomes  Strength WNL bilaterally.     Dermatomes  Sensation intact bilaterally    Neurodynamic Testing  Slump Test: -  SLR: -   Femoral Nerve Traction Test: -    Lumbar Active Range of Motion  Movement Loss Symptoms Jamaal Mod Min Nil Symptoms   Flexion   x  LBP   Extension  x   LBP   R SG    x    L SG    x    Other          Krystal Assessment  REIL: produced, better flexion ROM  GILBERT: produced, better flexion ROM    Hip Special Tests:  FADIRS= (-), FABERS= (+), Scours= (-), Distraction= (-)      SIJ Special Tests:  Compression= (-), Gapping= (-), FABERS= (+), Gaenslan's= (+ on left), Sacral Thrust/PA Pressure= (+), Post Thigh Thrust= (-)           Precautions: none      Manuals                                                                 Neuro Re-Ed Ther Ex             REIL HEP 2-3x/hr                                                                                                       Ther Activity                                       Gait Training                                       Modalities

## 2023-12-20 ENCOUNTER — OFFICE VISIT (OUTPATIENT)
Dept: PHYSICAL THERAPY | Facility: REHABILITATION | Age: 28
End: 2023-12-20
Payer: COMMERCIAL

## 2023-12-20 DIAGNOSIS — M54.50 ACUTE BILATERAL LOW BACK PAIN WITHOUT SCIATICA: Primary | ICD-10-CM

## 2023-12-20 PROCEDURE — 97110 THERAPEUTIC EXERCISES: CPT

## 2023-12-20 PROCEDURE — 97140 MANUAL THERAPY 1/> REGIONS: CPT

## 2023-12-20 NOTE — PROGRESS NOTES
Daily Note     Today's date: 2023  Patient name: Mellisa Jim  : 1995  MRN: [id number]  Referring provider: Cristina Mohamud DO  Dx:   Encounter Diagnosis     ICD-10-CM    1. Acute bilateral low back pain without sciatica  M54.50           Start Time: 1440  Stop Time: 1510  Total time in clinic (min): 30 minutes    Subjective: Patient reports overall she is feeling much better than last visit. Patient reports she has been compliant with HEP and use of lumbar roll. States symptoms are still localized to L SIJ.      Objective: See treatment diary below  Lumbar AROM  Flexion, L/R SG all pain free and WNL  <25% limited with lumbar AROM, slight ERP    Hip PROM  WNL in all directions    Negative FADIR, WILLIAM, slump, and SLR    Assessment: Tolerated treatment well today. Reduced ERP with HEP following lumbar PA mobs. Updated HEP to include REIL w/ pt OP unless this increases symptoms, in which case she resume original HEP as instructed. Will re-assess next visit. Patient would benefit from continued PT      Plan: Continue per plan of care.      Precautions: none      Manuals            Lumbar   PRR GR3-4 x10                                                  Neuro Re-Ed                                                                                                        Ther Ex             REIL HEP 2-3x/hr reviewed           REIL w/ pt OP  1x3 HEP                                                                                         Ther Activity                                       Gait Training                                       Modalities

## 2024-01-05 ENCOUNTER — OFFICE VISIT (OUTPATIENT)
Dept: PHYSICAL THERAPY | Facility: REHABILITATION | Age: 29
End: 2024-01-05
Payer: COMMERCIAL

## 2024-01-05 DIAGNOSIS — M54.50 ACUTE BILATERAL LOW BACK PAIN WITHOUT SCIATICA: Primary | ICD-10-CM

## 2024-01-05 DIAGNOSIS — M53.3 SACROILIAC JOINT DYSFUNCTION OF BOTH SIDES: ICD-10-CM

## 2024-01-05 PROCEDURE — 97110 THERAPEUTIC EXERCISES: CPT

## 2024-01-05 PROCEDURE — 97140 MANUAL THERAPY 1/> REGIONS: CPT

## 2024-01-05 NOTE — PROGRESS NOTES
"Daily Note     Today's date: 2024  Patient name: Mellisa Jim  : 1995  MRN: [id number]  Referring provider: Cristina Mohamud DO  Dx:   Encounter Diagnosis     ICD-10-CM    1. Acute bilateral low back pain without sciatica  M54.50       2. Sacroiliac joint dysfunction of both sides  M53.3           Start Time: 0930  Stop Time: 1000  Total time in clinic (min): 30 minutes    Subjective: Patient reports compliance with HEP, states no change in symptoms since updated HEP.       Objective: See treatment diary below  Lumbar AROM  Extension: 50% limited painful  Flexion: WNL  L SG: WNL  R SG: WNL    Negative FADIR, WILLIAM, slump test, and SLR    Assessment:  Significant reductions in lumbar extension in standing and prone. Patient able to perform PPU without symptoms following TE and neuro re-ed. Updated HEP see below. Patient would benefit from continued PT      Plan: Continue per plan of care.      Precautions: none      Manuals  1/5          Lumbar   PRR GR3-4 x10                                     Re-Assessment   PRR          Neuro Re-Ed             Supine Clamshell   Rhb x10 5\" hold          Supine Bridge   RHB x10 3\" hold                                                                           Ther Ex             REIL HEP 2-3x/hr reviewed reviewed          REIL w/ pt OP  1x3 HEP                                                                                         Ther Activity                                       Gait Training                                       Modalities                                              "

## 2024-01-10 ENCOUNTER — OFFICE VISIT (OUTPATIENT)
Dept: PHYSICAL THERAPY | Facility: REHABILITATION | Age: 29
End: 2024-01-10
Payer: COMMERCIAL

## 2024-01-10 DIAGNOSIS — M53.3 SACROILIAC JOINT DYSFUNCTION OF BOTH SIDES: ICD-10-CM

## 2024-01-10 DIAGNOSIS — M54.50 ACUTE BILATERAL LOW BACK PAIN WITHOUT SCIATICA: Primary | ICD-10-CM

## 2024-01-10 PROCEDURE — 97110 THERAPEUTIC EXERCISES: CPT

## 2024-01-10 NOTE — PROGRESS NOTES
"Daily Note     Today's date: 1/10/2024  Patient name: Mellisa Jim  : 1995  MRN: [id number]  Referring provider: Cristina Mohamud DO  Dx:   Encounter Diagnosis     ICD-10-CM    1. Acute bilateral low back pain without sciatica  M54.50       2. Sacroiliac joint dysfunction of both sides  M53.3           Start Time: 1230  Stop Time: 1250  Total time in clinic (min): 20 minutes    Subjective: Patient reports her back is feeling good since last visit. Patient reports she has been compliant with updated HEP which was working well.       Objective: See treatment diary below  Lumbar Active Range of Motion:  Flexion: WNL  Extension: <25% limited, slight ERP  R SG: WNL  L SG: WNL    Hip Passive Range of Motion  WNL in all directions without pain    Negative WILLIAM, FADIR, slump test, and SLR  Positive Sacral PA pressure (slight discomfort)      Assessment: Patient lumbar AROM significantly improved after last session and maintained throughout the week. Symptoms are improving nicely over the last week. Patient will be away for the next week and will be flying for 10 hours, discussed seat modifications with lumbar roll. Will see patient back in 2 weeks will re-evaluate to determine if further PT will be needed beyond that time. Patient would benefit from continued PT      Plan: Continue per plan of care.      Precautions: none      Manuals 12/13 12/20 1/5 1/10         Lumbar   PRR GR3-4 x10                                     Re-Assessment   PRR PRR         Neuro Re-Ed             Supine Clamshell   Rhb x10 5\" hold reviewed         Supine Bridge   RHB x10 3\" hold reviewed                                                                          Ther Ex             REIL HEP 2-3x/hr reviewed reviewed reviewed         REIL w/ pt OP  1x3 HEP           Pt Edu    10'                                                                          Ther Activity                                       Gait Training                  "                      Modalities

## 2024-01-24 ENCOUNTER — APPOINTMENT (OUTPATIENT)
Dept: PHYSICAL THERAPY | Facility: REHABILITATION | Age: 29
End: 2024-01-24
Payer: COMMERCIAL

## 2024-01-31 ENCOUNTER — OFFICE VISIT (OUTPATIENT)
Dept: PHYSICAL THERAPY | Facility: REHABILITATION | Age: 29
End: 2024-01-31
Payer: COMMERCIAL

## 2024-01-31 DIAGNOSIS — M54.50 ACUTE BILATERAL LOW BACK PAIN WITHOUT SCIATICA: Primary | ICD-10-CM

## 2024-01-31 DIAGNOSIS — M53.3 SACROILIAC JOINT DYSFUNCTION OF BOTH SIDES: ICD-10-CM

## 2024-01-31 PROCEDURE — 97140 MANUAL THERAPY 1/> REGIONS: CPT

## 2024-01-31 PROCEDURE — 97110 THERAPEUTIC EXERCISES: CPT

## 2024-01-31 NOTE — PROGRESS NOTES
"Progress / Daily Note     Today's date: 2024  Patient name: Mellisa Jim  : 1995  MRN: [id number]  Referring provider: Cristina Mohamud DO  Dx:   Encounter Diagnosis     ICD-10-CM    1. Acute bilateral low back pain without sciatica  M54.50       2. Sacroiliac joint dysfunction of both sides  M53.3           Start Time: 1400  Stop Time: 1435  Total time in clinic (min): 35 minutes    Subjective: Patient reports her low back did well over the last 3 weeks particularly with her trip.     Pain Levels  None reported    Objective: See treatment diary below  Lumbar AROM  Flexion: WNL, painfree  Extension: WNL, painfree  R SG WNL, painfree  L SG: WNL, painfree    Hip PROM  All WNL, painfree    Negative SLR, Slump, FADIR, and FABERS    Lumbar Mobility Testing  Painful with spring testing of L3-4      Assessment:   Patient maintained compliance with HEP over her trip. Patient had a few instances of pain which were brief and resolved with HEP. No pain with testing today and all ROM is WNL. Discussed slow return to function over the next month and continued compliance with HEP.  Patient will f/u with PT as needed. Anticipate discharge at that time. Patient would benefit from continued PT      Plan: Continue per plan of care.      Precautions: none      Manuals 12/13 12/20 1/5 1/10 1/31        Lumbar   PRR GR3-4 x10                                     Re-Assessment   PRR PRR PRR        Neuro Re-Ed             Supine Clamshell   Rhb x10 5\" hold reviewed         Supine Bridge   RHB x10 3\" hold reviewed                                                                          Ther Ex             REIL HEP 2-3x/hr reviewed reviewed reviewed         REIL w/ pt OP  1x3 HEP           Pt Edu    10' 10'                                                                         Ther Activity                                       Gait Training                                       Modalities                                  "

## 2024-02-09 ENCOUNTER — OFFICE VISIT (OUTPATIENT)
Dept: FAMILY MEDICINE CLINIC | Facility: CLINIC | Age: 29
End: 2024-02-09
Payer: COMMERCIAL

## 2024-02-09 ENCOUNTER — APPOINTMENT (OUTPATIENT)
Dept: LAB | Facility: CLINIC | Age: 29
End: 2024-02-09
Payer: COMMERCIAL

## 2024-02-09 VITALS
RESPIRATION RATE: 18 BRPM | OXYGEN SATURATION: 99 % | HEART RATE: 64 BPM | DIASTOLIC BLOOD PRESSURE: 84 MMHG | HEIGHT: 64 IN | TEMPERATURE: 97.7 F | BODY MASS INDEX: 29.26 KG/M2 | SYSTOLIC BLOOD PRESSURE: 128 MMHG | WEIGHT: 171.4 LBS

## 2024-02-09 DIAGNOSIS — R63.5 UNINTENDED WEIGHT GAIN: ICD-10-CM

## 2024-02-09 DIAGNOSIS — G47.9 SLEEP DISORDER: ICD-10-CM

## 2024-02-09 DIAGNOSIS — R10.32 LLQ PAIN: ICD-10-CM

## 2024-02-09 DIAGNOSIS — G47.00 INSOMNIA, UNSPECIFIED TYPE: ICD-10-CM

## 2024-02-09 DIAGNOSIS — D50.9 IRON DEFICIENCY ANEMIA, UNSPECIFIED IRON DEFICIENCY ANEMIA TYPE: ICD-10-CM

## 2024-02-09 DIAGNOSIS — Z00.00 WELL ADULT EXAM: Primary | ICD-10-CM

## 2024-02-09 DIAGNOSIS — E61.1 IRON DEFICIENCY: ICD-10-CM

## 2024-02-09 DIAGNOSIS — Z01.419 WOMEN'S ANNUAL ROUTINE GYNECOLOGICAL EXAMINATION: ICD-10-CM

## 2024-02-09 DIAGNOSIS — J35.1 TONSILLAR HYPERTROPHY: ICD-10-CM

## 2024-02-09 DIAGNOSIS — K21.9 GASTROESOPHAGEAL REFLUX DISEASE WITHOUT ESOPHAGITIS: ICD-10-CM

## 2024-02-09 LAB
ALBUMIN SERPL BCP-MCNC: 4.3 G/DL (ref 3.5–5)
ALP SERPL-CCNC: 47 U/L (ref 34–104)
ALT SERPL W P-5'-P-CCNC: 11 U/L (ref 7–52)
ANION GAP SERPL CALCULATED.3IONS-SCNC: 8 MMOL/L
AST SERPL W P-5'-P-CCNC: 16 U/L (ref 13–39)
BASOPHILS # BLD AUTO: 0.04 THOUSANDS/ÂΜL (ref 0–0.1)
BASOPHILS NFR BLD AUTO: 1 % (ref 0–1)
BILIRUB SERPL-MCNC: 0.53 MG/DL (ref 0.2–1)
BUN SERPL-MCNC: 9 MG/DL (ref 5–25)
CALCIUM SERPL-MCNC: 9.2 MG/DL (ref 8.4–10.2)
CHLORIDE SERPL-SCNC: 102 MMOL/L (ref 96–108)
CHOLEST SERPL-MCNC: 211 MG/DL
CO2 SERPL-SCNC: 26 MMOL/L (ref 21–32)
CREAT SERPL-MCNC: 0.81 MG/DL (ref 0.6–1.3)
EOSINOPHIL # BLD AUTO: 0.06 THOUSAND/ÂΜL (ref 0–0.61)
EOSINOPHIL NFR BLD AUTO: 1 % (ref 0–6)
ERYTHROCYTE [DISTWIDTH] IN BLOOD BY AUTOMATED COUNT: 13.2 % (ref 11.6–15.1)
FERRITIN SERPL-MCNC: 9 NG/ML (ref 11–307)
GFR SERPL CREATININE-BSD FRML MDRD: 99 ML/MIN/1.73SQ M
GLUCOSE P FAST SERPL-MCNC: 90 MG/DL (ref 65–99)
HCT VFR BLD AUTO: 42.7 % (ref 34.8–46.1)
HDLC SERPL-MCNC: 85 MG/DL
HGB BLD-MCNC: 13.1 G/DL (ref 11.5–15.4)
IMM GRANULOCYTES # BLD AUTO: 0.01 THOUSAND/UL (ref 0–0.2)
IMM GRANULOCYTES NFR BLD AUTO: 0 % (ref 0–2)
LDLC SERPL CALC-MCNC: 114 MG/DL (ref 0–100)
LYMPHOCYTES # BLD AUTO: 2.63 THOUSANDS/ÂΜL (ref 0.6–4.47)
LYMPHOCYTES NFR BLD AUTO: 34 % (ref 14–44)
MCH RBC QN AUTO: 27.3 PG (ref 26.8–34.3)
MCHC RBC AUTO-ENTMCNC: 30.7 G/DL (ref 31.4–37.4)
MCV RBC AUTO: 89 FL (ref 82–98)
MONOCYTES # BLD AUTO: 0.81 THOUSAND/ÂΜL (ref 0.17–1.22)
MONOCYTES NFR BLD AUTO: 11 % (ref 4–12)
NEUTROPHILS # BLD AUTO: 4.15 THOUSANDS/ÂΜL (ref 1.85–7.62)
NEUTS SEG NFR BLD AUTO: 53 % (ref 43–75)
NONHDLC SERPL-MCNC: 126 MG/DL
NRBC BLD AUTO-RTO: 0 /100 WBCS
PLATELET # BLD AUTO: 274 THOUSANDS/UL (ref 149–390)
PMV BLD AUTO: 10.6 FL (ref 8.9–12.7)
POTASSIUM SERPL-SCNC: 4.1 MMOL/L (ref 3.5–5.3)
PROT SERPL-MCNC: 7.6 G/DL (ref 6.4–8.4)
RBC # BLD AUTO: 4.79 MILLION/UL (ref 3.81–5.12)
SODIUM SERPL-SCNC: 136 MMOL/L (ref 135–147)
TRIGL SERPL-MCNC: 60 MG/DL
TSH SERPL DL<=0.05 MIU/L-ACNC: 1.15 UIU/ML (ref 0.45–4.5)
WBC # BLD AUTO: 7.7 THOUSAND/UL (ref 4.31–10.16)

## 2024-02-09 PROCEDURE — 80061 LIPID PANEL: CPT

## 2024-02-09 PROCEDURE — 80053 COMPREHEN METABOLIC PANEL: CPT

## 2024-02-09 PROCEDURE — 82728 ASSAY OF FERRITIN: CPT

## 2024-02-09 PROCEDURE — 99395 PREV VISIT EST AGE 18-39: CPT | Performed by: FAMILY MEDICINE

## 2024-02-09 PROCEDURE — 84443 ASSAY THYROID STIM HORMONE: CPT

## 2024-02-09 PROCEDURE — 99214 OFFICE O/P EST MOD 30 MIN: CPT | Performed by: FAMILY MEDICINE

## 2024-02-09 PROCEDURE — 85025 COMPLETE CBC W/AUTO DIFF WBC: CPT

## 2024-02-09 PROCEDURE — 36415 COLL VENOUS BLD VENIPUNCTURE: CPT

## 2024-02-09 RX ORDER — FAMOTIDINE 20 MG/1
20 TABLET, FILM COATED ORAL 2 TIMES DAILY
Qty: 60 TABLET | Refills: 1 | Status: SHIPPED | OUTPATIENT
Start: 2024-02-09 | End: 2025-02-03

## 2024-02-09 NOTE — PROGRESS NOTES
Assessment/Plan:     1. Well adult exam    2. Insomnia, unspecified type  Patient not clear why she has not been sleeping well recently.  Work on good sleep hygiene.  Call if persists or worsens.  -     CBC and differential; Future; Expected date: 02/10/2024  -     Comprehensive metabolic panel; Future  -     Lipid panel; Future  -     Ferritin; Future  -     TSH, 3rd generation; Future    3. Sleep disorder  Patient does not feel refreshed in the morning.  Has a very small airway with larger tonsils.  Recommend sleep study to rule out obstructive sleep apnea.  -     Ambulatory referral to Sleep Medicine; Future    4. Unintended weight gain  Check blood work including thyroid.  -     CBC and differential; Future; Expected date: 02/10/2024  -     Comprehensive metabolic panel; Future  -     Lipid panel; Future  -     Ferritin; Future  -     TSH, 3rd generation; Future    5. Iron deficiency anemia, unspecified iron deficiency anemia type  Assessment & Plan:  ` Update blood work.  Patient currently not taking iron.    Orders:  -     CBC and differential; Future; Expected date: 02/10/2024  -     Comprehensive metabolic panel; Future  -     Lipid panel; Future  -     Ferritin; Future  -     TSH, 3rd generation; Future    6. Tonsillar hypertrophy  See above.  7. LLQ pain  Differential diagnoses reviewed with patient.  Will check ultrasound to rule out ovarian cyst.  Call if persists or worsens.  -     US pelvis complete non OB; Future; Expected date: 02/09/2024    8. Gastroesophageal reflux disease without esophagitis  Assessment & Plan:  Start Pepcid 20 mg twice a day.  Reviewed food triggers.  Call if persists or worsens and can consider further testing and switch to omeprazole.    Orders:  -     famotidine (PEPCID) 20 mg tablet; Take 1 tablet (20 mg total) by mouth 2 (two) times a day    9. Women's annual routine gynecological examination  -     Ambulatory referral to Obstetrics / Gynecology; Future         Well adult  exam  ·         Continue healthy diet   ·         Encourage exercise 4 times a week or more for minimum 30 minutes.   ·         Continue to see dentist, wear seatbelt.  ·         Health maintenance reviewed -due for Pap smear.  Referral given today.  Update fasting blood work.  Reviewed age appropriate health maintenance screenings and immunizations that are due, risks and benefits of these.   Health Maintenance   Topic Date Due    Cervical Cancer Screening  05/03/2021    COVID-19 Vaccine (4 - 2023-24 season) 09/01/2023    PT PLAN OF CARE  01/12/2024    Depression Screening  02/09/2025    Annual Physical  02/09/2025    DTaP,Tdap,and Td Vaccines (5 - Td or Tdap) 11/03/2033    Zoster Vaccine (1 of 2) 05/11/2045    HIV Screening  Completed    Hepatitis C Screening  Completed    IPV Vaccine  Completed    Influenza Vaccine  Completed    HPV Vaccine  Completed    Pneumococcal Vaccine: Pediatrics (0 to 5 Years) and At-Risk Patients (6 to 64 Years)  Aged Out    HIB Vaccine  Aged Out    Hepatitis A Vaccine  Aged Out    Meningococcal ACWY Vaccine  Aged Out     No follow-ups on file.    Subjective:    CHELLY Pak is a 28 y.o. female who presents today for a physical.     Chief Complaint   Patient presents with    Physical Exam    GERD     Started 2 weeks ago, new concern. Reflux.    Insomnia     Started 3 weeks ago         Patient Care Team:  Sayda Rodriguez,  as PCP - General (Family Medicine)    PHQ-2/9 Depression Screening    Little interest or pleasure in doing things: 0 - not at all  Feeling down, depressed, or hopeless: 0 - not at all  PHQ-2 Score: 0  PHQ-2 Interpretation: Negative depression screen            ---Above per clinical staff & reviewed. ---  Patient here today for a physical:    Diet: healthy   Exercise:  3 -4 times a week   Dental visits:  yes  Sleep: 6 -7  hours    Concerns today:  Acid reflux is new, comes into her throat   This week all day   No change in diet   Eats 3 times a day   No change in diet  "  Drinks one cup of coffee per day     Not sleeping well for 3 weeks  Traveling a lot   Feels uncomfortable in her stomach as the cause   Periods once a month, last 5 days, changes pad or tampon every 4 -5 hrs.     Bowels not great after traveling             The following portions of the patient's history were reviewed and updated as appropriate: allergies, current medications, past family history, past medical history, past social history, past surgical history and problem list.     Current Medications:  Current Outpatient Medications   Medication Sig Dispense Refill    famotidine (PEPCID) 20 mg tablet Take 1 tablet (20 mg total) by mouth 2 (two) times a day 60 tablet 1     No current facility-administered medications for this visit.        Objective:      /84   Pulse 64   Temp 97.7 °F (36.5 °C) (Temporal)   Resp 18   Ht 5' 4\" (1.626 m)   Wt 77.7 kg (171 lb 6.4 oz)   SpO2 99%   BMI 29.42 kg/m²   BP Readings from Last 3 Encounters:   02/09/24 128/84   11/03/23 122/78   02/03/23 110/70     Wt Readings from Last 3 Encounters:   02/09/24 77.7 kg (171 lb 6.4 oz)   11/03/23 76.9 kg (169 lb 9.6 oz)   02/03/23 75.3 kg (166 lb)       Review of Systems  ROS:  all others negative - no chest pain, SOB, normal urine and bowels. no GERD. sleeping well. mood good. + abdominal pain + snoring + daytime fatigue     Physical Exam   Constitutional: she appears well-developed and well-nourished.   HENT: Head: Normocephalic.   Right Ear: External ear normal. Tympanic membrane normal.   Left Ear: External ear normal. Tympanic membrane normal.   Nose: Nose normal. No mucosal edema, No rhinorrhea.   Right sinus exhibits no maxillary sinus tenderness.   Left sinus exhibits no maxillary sinus tenderness.   Mouth/Throat: Oropharynx is clear and moist. Small airway tonsils 2+. No eryhtema or exudates.   Eyes: Normal conjunctiva.  No erythema. No discharge.  Neck: No pain on exam. Neck supple.   Cardiovascular: Normal rate, " regular rhythm and normal heart sounds.    Pulmonary/Chest: Effort normal and breath sounds normal. No wheezes. No rales. No rhonchi.   Abdominal: Soft. Bowel sounds are normal. There is + mild to moderate LLQ tenderness. No rebound, rigidity, guarding   Musculoskeletal: she exhibits no edema.   Lymphadenopathy: she has no cervical adenopathy.   Neurological: she  is alert and oriented to person, place, and time.   Skin: Skin is warm and dry. No rashes.  Psychiatric: she  has a normal mood and affect. her behavior is normal. Thought content normal.   Vitals reviewed.          Depression Screening and Follow-up Plan: Patient was screened for depression during today's encounter. They screened negative with a PHQ-2 score of 0.

## 2024-02-09 NOTE — PATIENT INSTRUCTIONS
Call [facility] for an appointment. You were previously referred to Dr. Ruano. Their phone number is [phone]. They're located on [address] [place] [address] in [place], [place].      What Is Acid Reflux?  Do you have to clear your throat or cough often? Are you hoarse? Do you have difficulty swallowing? If you have these or other throat symptoms, you may have acid reflux (when stomach acid washes up and irritates your throat).  Why You Have Throat Symptoms  At both ends of the esophagus (the tube that carries food to the stomach) are the esophageal sphincters. These muscles relax to let food pass, then tighten to keep stomach acid down. When the lower esophageal sphincter (LES) doesn’t tighten enough, acid can reflux from the stomach into the esophagus. This may cause heartburn. If the upper esophageal sphincter (UES) also doesn’t work well, acid can travel higher and enter your throat (pharynx). In many cases, this causes throat symptoms.  Common Throat Symptoms  ·      Frequent need to clear your throat  ·      Feeling like you’re choking  ·      Chronic cough  ·      Hoarseness  ·      Trouble swallowing  ·      Sensation of having “a lump in the throat”  ·      Sour or acid taste  ·      Recurrent sore throat         Tips to Control Acid Reflux  To control acid reflux, you’ll need to make some basic diet and lifestyle changes. The simple steps outlined below may be all you’ll need to relieve discomfort.  Watch What You Eat  ·      Avoid fatty foods and spicy foods.  ·      Eat fewer acidic foods, such as citrus and tomato-based foods. These can increase symptoms.  ·      Limit drinking alcohol, caffeine, and fizzy beverages. All increase acid reflux.  ·      Try limiting chocolate, peppermint, and spearmint. These can worsen acid reflux in some people.  Watch When You Eat  ·      Avoid lying down for 3 hours after eating.  ·      Do not snack before going to bed.  Raise Your Head  Raising your head and upper  body by 4 inches to 6 inches helps limit reflux when you’re lying down. Put blocks under the head of the bed frame to raise it.  Other Changes  ·      Lose weight, if you need to.  ·      Don’t work out near bedtime.  ·      Avoid tight-fitting clothes.  ·      Limit aspirin and ibuprofen.  ·      Stop smoking.         Discharge Instructions for Gastroesophageal Reflux Disease (GERD)  Gastroesophageal reflux disease (GERD) is a backflow of acid from the stomach into the swallowing tube (esophagus).  Home care  These home care steps can help you manage GERD:  ·      Maintain a healthy weight. Get help to lose any extra pounds.  ·      Avoid lying down after meals.  ·      Avoid eating late at night.  ·      Elevate the head of your bed by 6 inches. You can do this by placing wooden blocks under the head of your bed.  ·      Avoid wearing tight-fitting clothes.  ·      Avoid foods that might irritate your stomach, such as the following:  ¨    Alcohol  ¨    Fat  ¨    Chocolate  ¨    Caffeine  ¨    Spearmint or peppermint  ·      Talk to your doctor if you are taking any of the following medications. These medications can make GERD symptoms worse:  ¨    Calcium channel blockers  ¨    Theophylline  ¨    Anticholinergic medications such as oxybutynin and benzatropine  ·      Begin an exercise program. Ask your doctor how to get started. You can benefit from simple activities, such as walking or gardening.  ·      Break the smoking habit. Enroll in a stop-smoking program to improve your chances of success.  ·      Limit alcohol intake to no more than 2 drinks a day.  ·      Take your medications exactly as directed. Don’t skip doses.  ·      Avoid over-the-counter nonsteroidal anti-inflammatory drugs, such as aspirin and ibuprofen (Advil, Motrin).  ·      If possible, avoid nitrates (heart medications such as nitroglycerin and Isordil).  Follow-up care  Make a follow-up appointment as directed by our staff.     When to  seek medical care  Call your doctor immediately if you have any of the following:  ·      Trouble swallowing  ·      Pain when swallowing  ·      Feeling of food caught in your chest or throat  ·      Pain in the neck, chest, or back  ·      Heartburn that causes you to vomit  ·      Vomiting blood  ·      Black or tarry stools (from digested blood)  ·      More saliva (watering of the mouth) than usual  ·      Weight loss of more than 3% to 5% of your total body weight in a month  ·      Hoarseness or sore throat that won’t go away  ·      Choking, coughing, or wheezing         Medications for Acid Reflux  Your health care provider has told you that you have acid reflux. This is a condition that causes stomach acid to wash up into your throat. For most people, acid reflux is troubling but not dangerous. However, left untreated, acid reflux sometimes damages the esophagus. Medications can help control acid reflux and limit your risk of future problems.  Medications for Acid Reflux  Your health care provider may prescribe medication to help treat your acid reflux. Medication will be based on your symptoms and the results of any tests. Your health care provider will explain how to take your medication. You will also be told about possible side effects.     Reducing Stomach Acid   Common H2 receptor blockers include:   ·         nizatidine (Axid)   ·         famotidine (Pepcid, Pepcid AC)   ·         cimetidine (Tagamet, Tagamet HB)   ·         ranitidine (Zantac)  These can be taken daily or as needed. You can take them before a meal to help prevent symptoms.     Your doctor may suggest antacids that you can buy over the counter. Antacids can give fast relief. Or you may be told to take a type of medication called H2 blockers. These are available over the counter and by prescription (for higher doses).     Blocking Stomach Acid   Available proton pump inhibitors include:   ·         omeprazole (Prilosec, Prilosec OTC)  "  ·         aspirin and omeprazole (Yosprala)   ·         lansoprazole (Prevacid, Prevacid IV, Prevacid 24-Hour)   ·         dexlansoprazole (Dexilent, Dexilent Solutab)   ·         rabeprazole (Aciphex, Aciphex Sprinkle)   ·         pantoprazole (Protonix)     In more severe cases, your doctor may suggest stronger medications such as proton pump inhibitors (PPIs). These keep the stomach from making acid. They are often prescribed for long-term use. These are best take before a meal, and preferably breakfast.     Other Medications  If medications to reduce or block stomach acid don’t work, you may be switched to another type of medication that helps the stomach empty better.     Antacids  Popular over-the-counter medications like Tums, Maalox, Rolaids and Mylanta neutralize stomach acid and provide fast-acting relief. These are safe to use in combination with other medications for \"break through\" acid symptoms.      © 4385-2405 [employer] [address] [place] [address], [place] [address]. All rights reserved. This information is not intended as a substitute for professional medical care. Always follow your healthcare professional's instructions.    " Non-Reassuring FHR

## 2024-02-10 PROBLEM — K21.9 GASTROESOPHAGEAL REFLUX DISEASE WITHOUT ESOPHAGITIS: Status: ACTIVE | Noted: 2024-02-10

## 2024-02-13 DIAGNOSIS — G47.33 OSA (OBSTRUCTIVE SLEEP APNEA): Primary | ICD-10-CM

## 2024-02-14 RX ORDER — FERROUS SULFATE 325(65) MG
325 TABLET ORAL DAILY
Start: 2024-02-14

## 2024-02-22 ENCOUNTER — HOSPITAL ENCOUNTER (OUTPATIENT)
Dept: RADIOLOGY | Facility: HOSPITAL | Age: 29
Discharge: HOME | End: 2024-02-22
Payer: COMMERCIAL

## 2024-02-22 DIAGNOSIS — R10.32 LLQ PAIN: ICD-10-CM

## 2024-02-22 PROCEDURE — 76830 TRANSVAGINAL US NON-OB: CPT

## 2024-02-22 PROCEDURE — 76856 US EXAM PELVIC COMPLETE: CPT

## 2024-03-05 DIAGNOSIS — K21.9 GASTROESOPHAGEAL REFLUX DISEASE WITHOUT ESOPHAGITIS: ICD-10-CM

## 2024-03-05 RX ORDER — FAMOTIDINE 20 MG/1
20 TABLET, FILM COATED ORAL 2 TIMES DAILY
Qty: 180 TABLET | Refills: 1 | Status: SHIPPED | OUTPATIENT
Start: 2024-03-05

## 2024-03-07 ENCOUNTER — OFFICE VISIT (OUTPATIENT)
Dept: OBGYN CLINIC | Facility: CLINIC | Age: 29
End: 2024-03-07
Payer: COMMERCIAL

## 2024-03-07 VITALS
DIASTOLIC BLOOD PRESSURE: 62 MMHG | WEIGHT: 167 LBS | BODY MASS INDEX: 27.82 KG/M2 | SYSTOLIC BLOOD PRESSURE: 118 MMHG | HEIGHT: 65 IN

## 2024-03-07 DIAGNOSIS — Z01.419 ENCOUNTER FOR GYNECOLOGICAL EXAMINATION (GENERAL) (ROUTINE) WITHOUT ABNORMAL FINDINGS: ICD-10-CM

## 2024-03-07 PROCEDURE — S0610 ANNUAL GYNECOLOGICAL EXAMINA: HCPCS | Performed by: STUDENT IN AN ORGANIZED HEALTH CARE EDUCATION/TRAINING PROGRAM

## 2024-03-07 PROCEDURE — G0145 SCR C/V CYTO,THINLAYER,RESCR: HCPCS | Performed by: STUDENT IN AN ORGANIZED HEALTH CARE EDUCATION/TRAINING PROGRAM

## 2024-03-07 NOTE — ASSESSMENT & PLAN NOTE
-pap: 5/3/2018 NILM   -LMP: 2/14/24  -regular monthly menstrual cycles, denies dysmenorrhea or menorrhagia   -not sexually active  -STD testing: denies  -smoking: denies   -drinks alcohol socially  -recommend 30 min of exercise daily   -denies family history of ovarian, breast, colon cancer  -return in one year or earlier if needed

## 2024-03-07 NOTE — PROGRESS NOTES
"Assessment/Plan:    Encounter for gynecological examination (general) (routine) without abnormal findings  -pap: 5/3/2018 NILM   -LMP: 2/14/24  -regular monthly menstrual cycles, denies dysmenorrhea or menorrhagia   -not sexually active  -STD testing: denies  -smoking: denies   -drinks alcohol socially  -recommend 30 min of exercise daily   -denies family history of ovarian, breast, colon cancer  -return in one year or earlier if needed      Diagnoses and all orders for this visit:    Encounter for gynecological examination (general) (routine) without abnormal findings  -     Ambulatory referral to Obstetrics / Gynecology  -     Liquid-based pap, screening          Subjective:      Patient ID: Mellisa Jim is a 28 y.o. female.    29yo G0 LMP 2/14/24 presents for annual exam. No acute complaints         The following portions of the patient's history were reviewed and updated as appropriate: allergies, current medications, past family history, past medical history, past social history, past surgical history, and problem list.    Review of Systems   Constitutional:  Negative for appetite change, chills, fatigue and fever.   Respiratory:  Negative for cough, chest tightness, shortness of breath and wheezing.    Cardiovascular:  Negative for chest pain, palpitations and leg swelling.   Gastrointestinal:  Negative for abdominal distention, abdominal pain, constipation, diarrhea, nausea and vomiting.   Endocrine: Negative for cold intolerance, heat intolerance and polyuria.   Genitourinary:  Negative for difficulty urinating, dyspareunia, dysuria, genital sores, menstrual problem, vaginal bleeding, vaginal discharge and vaginal pain.   Neurological:  Negative for dizziness, weakness, light-headedness and headaches.         Objective:      /62 (BP Location: Right arm, Patient Position: Sitting, Cuff Size: Standard)   Ht 5' 4.96\" (1.65 m)   Wt 75.8 kg (167 lb)   LMP 02/14/2024   BMI 27.82 kg/m²          Physical " Exam  Constitutional:       General: She is not in acute distress.     Appearance: Normal appearance. She is normal weight.   Cardiovascular:      Rate and Rhythm: Normal rate.   Pulmonary:      Effort: Pulmonary effort is normal. No respiratory distress.   Chest:      Chest wall: No mass or tenderness.   Breasts:     Breasts are symmetrical.      Right: No swelling, bleeding, inverted nipple, mass, nipple discharge, skin change or tenderness.      Left: No swelling, bleeding, inverted nipple, mass, nipple discharge, skin change or tenderness.   Abdominal:      General: There is no distension.      Palpations: There is no mass.      Tenderness: There is no abdominal tenderness. There is no guarding or rebound.   Genitourinary:     General: Normal vulva.      Exam position: Lithotomy position.      Pubic Area: No rash.       Labia:         Right: No rash, tenderness, lesion or injury.         Left: No rash, tenderness, lesion or injury.       Urethra: No prolapse, urethral pain, urethral swelling or urethral lesion.      Vagina: No signs of injury. No vaginal discharge, tenderness, bleeding, lesions or prolapsed vaginal walls.      Cervix: No cervical motion tenderness, discharge, friability, lesion or erythema.      Uterus: Not deviated, not enlarged, not fixed, not tender and no uterine prolapse.       Adnexa:         Right: No mass, tenderness or fullness.          Left: No mass, tenderness or fullness.     Musculoskeletal:      Right lower leg: No edema.      Left lower leg: No edema.   Lymphadenopathy:      Upper Body:      Right upper body: No supraclavicular, axillary or pectoral adenopathy.      Left upper body: No supraclavicular, axillary or pectoral adenopathy.   Neurological:      Mental Status: She is alert and oriented to person, place, and time.   Psychiatric:         Mood and Affect: Mood normal.

## 2024-03-19 LAB
LAB AP GYN PRIMARY INTERPRETATION: NORMAL
Lab: NORMAL

## 2024-05-01 PROBLEM — Z01.419 ENCOUNTER FOR GYNECOLOGICAL EXAMINATION (GENERAL) (ROUTINE) WITHOUT ABNORMAL FINDINGS: Status: RESOLVED | Noted: 2024-03-07 | Resolved: 2024-05-01

## 2024-06-10 ENCOUNTER — OFFICE VISIT (OUTPATIENT)
Dept: FAMILY MEDICINE CLINIC | Facility: CLINIC | Age: 29
End: 2024-06-10
Payer: COMMERCIAL

## 2024-06-10 VITALS
OXYGEN SATURATION: 99 % | BODY MASS INDEX: 27.39 KG/M2 | HEIGHT: 65 IN | SYSTOLIC BLOOD PRESSURE: 128 MMHG | TEMPERATURE: 97.6 F | HEART RATE: 81 BPM | DIASTOLIC BLOOD PRESSURE: 72 MMHG | RESPIRATION RATE: 16 BRPM | WEIGHT: 164.4 LBS

## 2024-06-10 DIAGNOSIS — R05.1 ACUTE COUGH: ICD-10-CM

## 2024-06-10 DIAGNOSIS — J06.9 VIRAL URI: Primary | ICD-10-CM

## 2024-06-10 PROCEDURE — 99213 OFFICE O/P EST LOW 20 MIN: CPT | Performed by: FAMILY MEDICINE

## 2024-06-10 NOTE — PROGRESS NOTES
Assessment/Plan:  Problem List Items Addressed This Visit    None  Visit Diagnoses       Viral URI    -  Primary    Improving.  Patient declines Tessalon Perles.      Advise Magdy med sinus rinse kit, Mucinex, Claritin/Zyrtec/Allegra/Xyzal, Flonase / Nasacort nasal spray.  Avoid decongestants if you have high blood pressure.      Acute cough        See above.                   Return if symptoms worsen or fail to improve.      Future Appointments   Date Time Provider Department Center   2/21/2025 10:00 AM Sayda Rodriguez DO [facility]        Subjective:     Mellisa is a 29 y.o. female who presents today for a follow-up on her acute medical conditions.        HPI:  Chief Complaint   Patient presents with    Cold Like Symptoms     Pt c/o persistent cough, scratchy throat, and neck tenderness for the last 7-10 days. Was also having fevers, night sweats, SOB, and chest tightness, but these have resolved. Currently using Theraflu and ibuprofen for sx management. Tested negative for covid last week and two days ago.      -- Above per clinical staff and reviewed. --    HPI      Today:      URI - Symptoms x 9 days, improving.  Feels 70-80% improved.  Negative COVID test x 3.  Fever, night sweats, SOB, chest tightness have resolved.  Productive cough and scratching throat continue.  Using Theraflu and Ibuprofen c benefit.  She will be traveling next week.      The following portions of the patient's history were reviewed and updated as appropriate: allergies, current medications, past family history, past medical history, past social history, past surgical history and problem list.      Review of Systems   Constitutional:  Negative for appetite change, chills, diaphoresis, fatigue and fever.   Respiratory:  Positive for cough. Negative for chest tightness, shortness of breath and wheezing.    Cardiovascular:  Negative for chest pain.   Gastrointestinal:  Negative for abdominal pain, blood in stool, diarrhea, nausea and  "vomiting.   Genitourinary:  Negative for dysuria.        Current Outpatient Medications   Medication Sig Dispense Refill    famotidine (PEPCID) 20 mg tablet TAKE 1 TABLET BY MOUTH TWICE A  tablet 1    ferrous sulfate 325 (65 Fe) mg tablet Take 1 tablet (325 mg total) by mouth daily OTC. Take a with meal & vitamin C.       No current facility-administered medications for this visit.       Objective:  /72   Pulse 81   Temp 97.6 °F (36.4 °C)   Resp 16   Ht 5' 4.96\" (1.65 m)   Wt 74.6 kg (164 lb 6.4 oz)   SpO2 99%   BMI 27.39 kg/m²    Wt Readings from Last 3 Encounters:   06/10/24 74.6 kg (164 lb 6.4 oz)   03/07/24 75.8 kg (167 lb)   02/09/24 77.7 kg (171 lb 6.4 oz)      BP Readings from Last 3 Encounters:   06/10/24 128/72   03/07/24 118/62   02/09/24 128/84          Physical Exam  Vitals and nursing note reviewed.   Constitutional:       Appearance: Normal appearance. She is well-developed.   HENT:      Head: Normocephalic and atraumatic.      Right Ear: Tympanic membrane, ear canal and external ear normal.      Left Ear: Tympanic membrane, ear canal and external ear normal.      Nose: Congestion present.      Right Sinus: No maxillary sinus tenderness or frontal sinus tenderness.      Left Sinus: No maxillary sinus tenderness or frontal sinus tenderness.      Mouth/Throat:      Mouth: Mucous membranes are moist.      Pharynx: Oropharynx is clear. Uvula midline. No oropharyngeal exudate or posterior oropharyngeal erythema.      Tonsils: No tonsillar exudate.   Eyes:      Conjunctiva/sclera: Conjunctivae normal.   Cardiovascular:      Rate and Rhythm: Normal rate and regular rhythm.      Pulses: Normal pulses.      Heart sounds: Normal heart sounds.   Pulmonary:      Effort: Pulmonary effort is normal.      Breath sounds: Normal breath sounds.   Musculoskeletal:         General: No swelling or tenderness.      Cervical back: Neck supple.      Right lower leg: No edema.      Left lower leg: No edema. " "  Lymphadenopathy:      Cervical: No cervical adenopathy.   Skin:     Findings: No rash.   Neurological:      General: No focal deficit present.      Mental Status: She is alert and oriented to person, place, and time.   Psychiatric:         Mood and Affect: Mood normal.         Lab Results:      Lab Results   Component Value Date    WBC 7.70 02/09/2024    HGB 13.1 02/09/2024    HCT 42.7 02/09/2024     02/09/2024    TRIG 60 02/09/2024    HDL 85 02/09/2024    LDLDIRECT 79 12/06/2019    ALT 11 02/09/2024    AST 16 02/09/2024    K 4.1 02/09/2024     02/09/2024    CREATININE 0.81 02/09/2024    BUN 9 02/09/2024    CO2 26 02/09/2024    GLUF 90 02/09/2024     No results found for: \"URICACID\"  Invalid input(s): \"BASENAME\" Vitamin D    No results found.     POCT Labs                       "

## 2024-06-10 NOTE — PATIENT INSTRUCTIONS
Advise Magdy med sinus rinse kit, Mucinex, Claritin/Zyrtec/Allegra/Xyzal, Flonase / Nasacort nasal spray.  Avoid decongestants if you have high blood pressure.

## 2025-05-01 ENCOUNTER — OFFICE VISIT (OUTPATIENT)
Dept: FAMILY MEDICINE CLINIC | Facility: CLINIC | Age: 30
End: 2025-05-01
Payer: COMMERCIAL

## 2025-05-01 ENCOUNTER — TELEPHONE (OUTPATIENT)
Dept: FAMILY MEDICINE CLINIC | Facility: CLINIC | Age: 30
End: 2025-05-01

## 2025-05-01 VITALS
DIASTOLIC BLOOD PRESSURE: 80 MMHG | BODY MASS INDEX: 28.07 KG/M2 | TEMPERATURE: 98.7 F | HEIGHT: 64 IN | SYSTOLIC BLOOD PRESSURE: 124 MMHG | WEIGHT: 164.4 LBS | HEART RATE: 80 BPM | OXYGEN SATURATION: 100 %

## 2025-05-01 DIAGNOSIS — L63.9 ALOPECIA AREATA: ICD-10-CM

## 2025-05-01 DIAGNOSIS — D50.9 IRON DEFICIENCY ANEMIA, UNSPECIFIED IRON DEFICIENCY ANEMIA TYPE: ICD-10-CM

## 2025-05-01 DIAGNOSIS — E78.00 HYPERCHOLESTEREMIA: ICD-10-CM

## 2025-05-01 DIAGNOSIS — Z00.00 WELL ADULT EXAM: Primary | ICD-10-CM

## 2025-05-01 PROCEDURE — 99214 OFFICE O/P EST MOD 30 MIN: CPT | Performed by: FAMILY MEDICINE

## 2025-05-01 PROCEDURE — 99395 PREV VISIT EST AGE 18-39: CPT | Performed by: FAMILY MEDICINE

## 2025-05-01 RX ORDER — CLOBETASOL PROPIONATE 0.05 MG/G
GEL TOPICAL
Qty: 30 G | Refills: 0 | Status: SHIPPED | OUTPATIENT
Start: 2025-05-01

## 2025-05-01 NOTE — ASSESSMENT & PLAN NOTE
Update labs   Pt taking iron daily   If low consider further workup, iron infusion       Orders:  •  CBC and differential; Future  •  Iron Panel (Includes Ferritin, Iron Sat%, Iron, and TIBC); Future  •  Lipid panel; Future  •  Comprehensive metabolic panel; Future  •  Vitamin B12; Future  •  Vitamin D 25 hydroxy; Future  •  TSH, 3rd generation; Future

## 2025-05-01 NOTE — PROGRESS NOTES
Adult Annual Physical  Name: Mellisa Jim      : 1995      MRN: [id number]  Encounter Provider: Sayda Rodriguez DO  Encounter Date: 2025   Encounter department: [facility]       Assessment/Plan:     Assessment & Plan  Well adult exam  See below   Orders:  •  CBC and differential; Future  •  Iron Panel (Includes Ferritin, Iron Sat%, Iron, and TIBC); Future  •  Lipid panel; Future  •  Comprehensive metabolic panel; Future  •  Vitamin B12; Future  •  Vitamin D 25 hydroxy; Future  •  TSH, 3rd generation; Future    Iron deficiency anemia, unspecified iron deficiency anemia type  Update labs   Pt taking iron daily   If low consider further workup, iron infusion       Orders:  •  CBC and differential; Future  •  Iron Panel (Includes Ferritin, Iron Sat%, Iron, and TIBC); Future  •  Lipid panel; Future  •  Comprehensive metabolic panel; Future  •  Vitamin B12; Future  •  Vitamin D 25 hydroxy; Future  •  TSH, 3rd generation; Future    Hypercholesteremia  Update labs   ASCVD risk low   Orders:  •  CBC and differential; Future  •  Iron Panel (Includes Ferritin, Iron Sat%, Iron, and TIBC); Future  •  Lipid panel; Future  •  Comprehensive metabolic panel; Future  •  Vitamin B12; Future  •  Vitamin D 25 hydroxy; Future  •  TSH, 3rd generation; Future    Alopecia areata  Differential diagnosis reviewed   Check labs   Start topical steroid   Refer to derm if not improving with some hair growth in about 4 weeks   Orders:  •  CBC and differential; Future  •  Iron Panel (Includes Ferritin, Iron Sat%, Iron, and TIBC); Future  •  Lipid panel; Future  •  Comprehensive metabolic panel; Future  •  Vitamin B12; Future  •  Vitamin D 25 hydroxy; Future  •  TSH, 3rd generation; Future  •  clobetasol (TEMOVATE) 0.05 % GEL; Twice a day x 2 weeks, then once a day x 2 weeks. Do not use on face or groin.  •  Ambulatory Referral to Dermatology; Future            Well adult exam  ·         Continue healthy  diet   ·         Encourage exercise 4 times a week or more for minimum 30 minutes.   ·         Continue to see dentist, wear seatbelt.  ·         Health maintenance reviewed and up-to-date  Reviewed age appropriate health maintenance screenings and immunizations that are due, risks and benefits of these.   Health Maintenance   Topic Date Due   • PT PLAN OF CARE  01/12/2024   • Annual Physical  02/09/2025   • COVID-19 Vaccine (4 - 2024-25 season) 08/01/2025 (Originally 9/1/2024)   • Depression Screening  05/01/2026   • Cervical Cancer Screening  03/07/2027   • DTaP,Tdap,and Td Vaccines (5 - Td or Tdap) 11/03/2033   • Zoster Vaccine (1 of 2) 05/11/2045   • HIV Screening  Completed   • Hepatitis C Screening  Completed   • IPV Vaccine  Completed   • Influenza Vaccine  Completed   • HPV Vaccine  Completed   • Meningococcal B Vaccine  Aged Out   • RSV Vaccine age 0-20 Months  Aged Out   • Pneumococcal Vaccine: Pediatrics (0 to 5 Years) and At-Risk Patients (6 to 64 Years)  Aged Out   • HIB Vaccine  Aged Out   • Hepatitis A Vaccine  Aged Out   • Meningococcal ACWY Vaccine  Aged Out     Preventive Screenings:    - Cervical cancer screening: screening up-to-date     Return in about 1 year (around 5/1/2026) for Annual physical.    Subjective:    CHELLY Pak is a 29 y.o. female who presents today for a physical.     Chief Complaint   Patient presents with   • Physical Exam     Pt found a bald spot on lower head in the back       Patient Care Team:  Sayda Rodriguez DO as PCP - General (Family Medicine)    ---Above per clinical staff & reviewed. ---  History of Present Illness     Patient here today for a physical:  Adult Annual Physical:  Patient presents for annual physical.     Diet and Physical Activity:  - Diet/Nutrition: no special diet and well balanced diet.  - Exercise: walking, strength training exercises, 5-7 times a week on average and 30-60 minutes on average.    Depression Screening:  - PHQ-2 Score: 0    General  "Health:  - Sleep: 7-8 hours of sleep on average.  - Hearing: normal hearing right ear and normal hearing left ear.  - Vision: most recent eye exam < 1 year ago and wears glasses.  - Dental: no dental visits for > 1 year, brushes teeth once daily and floss regularly.    /GYN Health:  - Follows with GYN: no.   - Last menstrual cycle: 4/18/2025.   - History of STDs: no    Advanced Care Planning:  - Has an advanced directive?: no    - Has a durable medical POA?: no          Concerns today:  Bald spot back of her head a week ago   Never had this before   Stress normal   Periods every 28 days 4 days, changes pad/tampon every 3 hours Vasu decent   No family history of anemia or thalassemias that she is aware of           The following portions of the patient's history were reviewed and updated as appropriate: allergies, current medications, past family history, past medical history, past social history, past surgical history and problem list.     Current Medications:  Current Outpatient Medications   Medication Sig Dispense Refill   • clobetasol (TEMOVATE) 0.05 % GEL Twice a day x 2 weeks, then once a day x 2 weeks. Do not use on face or groin. 30 g 0   • ferrous sulfate 325 (65 Fe) mg tablet Take 1 tablet (325 mg total) by mouth daily OTC. Take a with meal & vitamin C.       No current facility-administered medications for this visit.        Objective:      /80 (Patient Position: Sitting, Cuff Size: Standard)   Pulse 80   Temp 98.7 °F (37.1 °C) (Temporal)   Ht 5' 4\" (1.626 m)   Wt 74.6 kg (164 lb 6.4 oz)   LMP 04/18/2025   SpO2 100%   BMI 28.22 kg/m²     BP Readings from Last 3 Encounters:   05/01/25 124/80   06/10/24 128/72   03/07/24 118/62     Wt Readings from Last 3 Encounters:   05/01/25 74.6 kg (164 lb 6.4 oz)   06/10/24 74.6 kg (164 lb 6.4 oz)   03/07/24 75.8 kg (167 lb)       PHQ-2/9 Depression Screening    Little interest or pleasure in doing things: 0 - not at all  Feeling down, depressed, or " "hopeless: 0 - not at all  PHQ-2 Score: 0  PHQ-2 Interpretation: Negative depression screen            Physical Exam  HENT:      Head:        Constitutional: she appears well-developed and well-nourished.   HENT: Head: Normocephalic.   Right Ear: External ear normal. Tympanic membrane normal.   Left Ear: External ear normal. Tympanic membrane normal.   Nose: Nose normal. No mucosal edema, No rhinorrhea.   Right sinus exhibits no maxillary sinus tenderness.   Left sinus exhibits no maxillary sinus tenderness.   Mouth/Throat: Oropharynx is clear and moist.   Eyes: Normal conjunctiva.  No erythema. No discharge.  Neck: No pain on exam. Neck supple.   Cardiovascular: Normal rate, regular rhythm and normal heart sounds.    Pulmonary/Chest: Effort normal and breath sounds normal. No wheezes. No rales. No rhonchi.   Abdominal: Soft. Bowel sounds are normal. There is no tenderness.   Musculoskeletal: she exhibits no edema.   Lymphadenopathy: she has no cervical adenopathy.   Neurological: she  is alert and oriented to person, place, and time.   Skin: Skin is warm and dry. No rashes.  Psychiatric: she  has a normal mood and affect. her behavior is normal. Thought content normal.   Vitals reviewed.            Review of Systems  ROS:  all others negative - no chest pain, SOB, normal urine and bowels. no GERD. sleeping well. mood good.           Objective   /80 (Patient Position: Sitting, Cuff Size: Standard)   Pulse 80   Temp 98.7 °F (37.1 °C) (Temporal)   Ht 5' 4\" (1.626 m)   Wt 74.6 kg (164 lb 6.4 oz)   LMP 04/18/2025   SpO2 100%   BMI 28.22 kg/m²               "

## 2025-05-23 ENCOUNTER — PATIENT MESSAGE (OUTPATIENT)
Dept: FAMILY MEDICINE CLINIC | Facility: CLINIC | Age: 30
End: 2025-05-23

## 2025-05-23 DIAGNOSIS — L63.9 ALOPECIA AREATA: Primary | ICD-10-CM

## 2025-05-23 RX ORDER — BETAMETHASONE DIPROPIONATE 0.5 MG/G
CREAM TOPICAL 2 TIMES DAILY
Qty: 30 G | Refills: 0 | Status: SHIPPED | OUTPATIENT
Start: 2025-05-23

## 2025-08-05 ENCOUNTER — OFFICE VISIT (OUTPATIENT)
Dept: OBGYN CLINIC | Facility: CLINIC | Age: 30
End: 2025-08-05
Payer: COMMERCIAL

## 2025-08-05 VITALS
HEIGHT: 64 IN | SYSTOLIC BLOOD PRESSURE: 118 MMHG | DIASTOLIC BLOOD PRESSURE: 80 MMHG | BODY MASS INDEX: 27.18 KG/M2 | HEART RATE: 76 BPM | WEIGHT: 159.2 LBS

## 2025-08-05 DIAGNOSIS — Z30.014 ENCOUNTER FOR INITIAL PRESCRIPTION OF INTRAUTERINE CONTRACEPTIVE DEVICE (IUD): Primary | ICD-10-CM

## 2025-08-05 DIAGNOSIS — Z11.3 SCREEN FOR STD (SEXUALLY TRANSMITTED DISEASE): ICD-10-CM

## 2025-08-05 PROBLEM — G43.011 MIGRAINE WITHOUT AURA, WITH INTRACTABLE MIGRAINE, SO STATED, WITH STATUS MIGRAINOSUS: Status: ACTIVE | Noted: 2025-08-05

## 2025-08-05 PROCEDURE — 99213 OFFICE O/P EST LOW 20 MIN: CPT | Performed by: PHYSICIAN ASSISTANT

## 2025-08-05 PROCEDURE — 87491 CHLMYD TRACH DNA AMP PROBE: CPT | Performed by: PHYSICIAN ASSISTANT

## 2025-08-05 PROCEDURE — 87591 N.GONORRHOEAE DNA AMP PROB: CPT | Performed by: PHYSICIAN ASSISTANT

## 2025-08-05 RX ORDER — TRETINOIN 0.25 MG/G
CREAM TOPICAL
COMMUNITY
Start: 2025-07-14

## 2025-08-05 RX ORDER — MISOPROSTOL 200 UG/1
TABLET ORAL
Qty: 1 TABLET | Refills: 0 | Status: SHIPPED | OUTPATIENT
Start: 2025-08-05

## 2025-08-05 RX ORDER — IBUPROFEN 800 MG/1
800 TABLET, FILM COATED ORAL EVERY 6 HOURS PRN
Qty: 30 TABLET | Refills: 0 | Status: SHIPPED | OUTPATIENT
Start: 2025-08-05

## 2025-08-06 LAB
C TRACH DNA SPEC QL NAA+PROBE: NEGATIVE
N GONORRHOEA DNA SPEC QL NAA+PROBE: NEGATIVE

## 2025-08-18 ENCOUNTER — TELEPHONE (OUTPATIENT)
Dept: OBGYN CLINIC | Facility: CLINIC | Age: 30
End: 2025-08-18

## 2025-08-22 ENCOUNTER — TELEPHONE (OUTPATIENT)
Age: 30
End: 2025-08-22

## 2025-08-22 DIAGNOSIS — Z30.014 ENCOUNTER FOR INITIAL PRESCRIPTION OF INTRAUTERINE CONTRACEPTIVE DEVICE (IUD): ICD-10-CM

## 2025-08-27 RX ORDER — LEVONORGESTREL 13.5 MG/1
1 INTRAUTERINE DEVICE INTRAUTERINE
Qty: 1 EACH | Refills: 0 | OUTPATIENT
Start: 2025-08-27